# Patient Record
Sex: MALE | Race: WHITE | NOT HISPANIC OR LATINO | Employment: STUDENT | URBAN - METROPOLITAN AREA
[De-identification: names, ages, dates, MRNs, and addresses within clinical notes are randomized per-mention and may not be internally consistent; named-entity substitution may affect disease eponyms.]

---
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
## 2023-03-21 ENCOUNTER — TELEPHONE (OUTPATIENT)
Dept: INTERNAL MEDICINE | Facility: CLINIC | Age: 18
End: 2023-03-21
Payer: MEDICAID

## 2023-03-21 NOTE — TELEPHONE ENCOUNTER
----- Message from Con Marques MD sent at 3/20/2023  6:56 AM CDT -----  [facility] pt f/u appt.

## 2023-03-28 ENCOUNTER — PATIENT MESSAGE (OUTPATIENT)
Dept: SPINE | Facility: CLINIC | Age: 18
End: 2023-03-28
Payer: MEDICAID

## 2023-03-28 ENCOUNTER — TELEPHONE (OUTPATIENT)
Dept: NEUROSURGERY | Facility: CLINIC | Age: 18
End: 2023-03-28
Payer: MEDICAID

## 2023-03-28 NOTE — TELEPHONE ENCOUNTER
----- Message from Rozina Hernandez sent at 3/28/2023  2:26 PM CDT -----  Regarding: call back  Contact: Reina [phone]  Reina pt mom calling to make sure an get the status of chart  notes an anything else's that needs to be sent over before appt please call  to discuss  further

## 2023-03-28 NOTE — TELEPHONE ENCOUNTER
Informed patient that I haven't received any imaging as of yet and I will call once I receive it patient verbally understood.

## 2023-03-29 ENCOUNTER — PATIENT MESSAGE (OUTPATIENT)
Dept: NEUROSURGERY | Facility: CLINIC | Age: 18
End: 2023-03-29
Payer: MEDICAID

## 2023-03-30 ENCOUNTER — TELEPHONE (OUTPATIENT)
Dept: NEUROSURGERY | Facility: CLINIC | Age: 18
End: 2023-03-30
Payer: MEDICAID

## 2023-03-30 NOTE — TELEPHONE ENCOUNTER
----- Message from Daisy Copeland sent at 3/29/2023  5:56 PM CDT -----  Regarding: PT ADVICE  Contact: PT'S MOM  Pt's mom called regarding medical records sent by certified mail. Pt's mom states that confirmation receipt shows records were delivered on 3.27.23. Pt's mom would like a call back regarding an appt for her son and states that the May appt is too far out.    Please advise. Pt's mom can be reached at [phone].

## 2023-04-01 ENCOUNTER — PATIENT MESSAGE (OUTPATIENT)
Dept: NEUROSURGERY | Facility: CLINIC | Age: 18
End: 2023-04-01
Payer: MEDICAID

## 2023-04-03 ENCOUNTER — OFFICE VISIT (OUTPATIENT)
Dept: INTERNAL MEDICINE | Facility: CLINIC | Age: 18
End: 2023-04-03
Payer: MEDICAID

## 2023-04-03 ENCOUNTER — TELEPHONE (OUTPATIENT)
Dept: PAIN MEDICINE | Facility: CLINIC | Age: 18
End: 2023-04-03
Payer: MEDICAID

## 2023-04-03 VITALS
WEIGHT: 166 LBS | HEIGHT: 75 IN | TEMPERATURE: 98 F | SYSTOLIC BLOOD PRESSURE: 106 MMHG | BODY MASS INDEX: 20.64 KG/M2 | HEART RATE: 65 BPM | DIASTOLIC BLOOD PRESSURE: 78 MMHG

## 2023-04-03 DIAGNOSIS — Z28.9 DELAYED IMMUNIZATIONS: ICD-10-CM

## 2023-04-03 DIAGNOSIS — M54.2 NECK PAIN: Primary | ICD-10-CM

## 2023-04-03 DIAGNOSIS — M54.12 CERVICAL RADICULOPATHY: ICD-10-CM

## 2023-04-03 DIAGNOSIS — Z01.00 ROUTINE EYE EXAM: ICD-10-CM

## 2023-04-03 PROCEDURE — 1159F MED LIST DOCD IN RCRD: CPT | Mod: CPTII,,, | Performed by: FAMILY MEDICINE

## 2023-04-03 PROCEDURE — 3008F BODY MASS INDEX DOCD: CPT | Mod: CPTII,,, | Performed by: FAMILY MEDICINE

## 2023-04-03 PROCEDURE — 99214 OFFICE O/P EST MOD 30 MIN: CPT | Mod: S$PBB,,, | Performed by: FAMILY MEDICINE

## 2023-04-03 PROCEDURE — 3078F PR MOST RECENT DIASTOLIC BLOOD PRESSURE < 80 MM HG: ICD-10-PCS | Mod: CPTII,,, | Performed by: FAMILY MEDICINE

## 2023-04-03 PROCEDURE — 3074F PR MOST RECENT SYSTOLIC BLOOD PRESSURE < 130 MM HG: ICD-10-PCS | Mod: CPTII,,, | Performed by: FAMILY MEDICINE

## 2023-04-03 PROCEDURE — 99215 OFFICE O/P EST HI 40 MIN: CPT | Mod: PBBFAC,PO | Performed by: FAMILY MEDICINE

## 2023-04-03 PROCEDURE — 99999 PR PBB SHADOW E&M-EST. PATIENT-LVL V: CPT | Mod: PBBFAC,,, | Performed by: FAMILY MEDICINE

## 2023-04-03 PROCEDURE — 3008F PR BODY MASS INDEX (BMI) DOCUMENTED: ICD-10-PCS | Mod: CPTII,,, | Performed by: FAMILY MEDICINE

## 2023-04-03 PROCEDURE — 3074F SYST BP LT 130 MM HG: CPT | Mod: CPTII,,, | Performed by: FAMILY MEDICINE

## 2023-04-03 PROCEDURE — 3078F DIAST BP <80 MM HG: CPT | Mod: CPTII,,, | Performed by: FAMILY MEDICINE

## 2023-04-03 PROCEDURE — 96372 THER/PROPH/DIAG INJ SC/IM: CPT | Mod: PBBFAC,PO

## 2023-04-03 PROCEDURE — 99999 PR PBB SHADOW E&M-EST. PATIENT-LVL V: ICD-10-PCS | Mod: PBBFAC,,, | Performed by: FAMILY MEDICINE

## 2023-04-03 PROCEDURE — 1159F PR MEDICATION LIST DOCUMENTED IN MEDICAL RECORD: ICD-10-PCS | Mod: CPTII,,, | Performed by: FAMILY MEDICINE

## 2023-04-03 PROCEDURE — 99214 PR OFFICE/OUTPT VISIT, EST, LEVL IV, 30-39 MIN: ICD-10-PCS | Mod: S$PBB,,, | Performed by: FAMILY MEDICINE

## 2023-04-03 RX ORDER — CYCLOBENZAPRINE HCL 10 MG
10 TABLET ORAL NIGHTLY
Qty: 30 TABLET | Refills: 0 | Status: SHIPPED | OUTPATIENT
Start: 2023-04-03 | End: 2023-04-17

## 2023-04-03 RX ORDER — MELOXICAM 15 MG/1
15 TABLET ORAL DAILY
Qty: 30 TABLET | Refills: 0 | Status: SHIPPED | OUTPATIENT
Start: 2023-04-03 | End: 2023-11-02

## 2023-04-03 RX ORDER — KETOROLAC TROMETHAMINE 30 MG/ML
60 INJECTION, SOLUTION INTRAMUSCULAR; INTRAVENOUS
Status: COMPLETED | OUTPATIENT
Start: 2023-04-03 | End: 2023-04-03

## 2023-04-03 RX ADMIN — KETOROLAC TROMETHAMINE 60 MG: 60 INJECTION, SOLUTION INTRAMUSCULAR at 11:04

## 2023-04-03 NOTE — PROGRESS NOTES
Subjective:       Patient ID: Chaitanya Ace is a 18 y.o. male.    Chief Complaint: Neck Pain    Neck Pain   This is a chronic problem. The current episode started more than 1 year ago. The problem occurs constantly. The problem has been gradually worsening. Pertinent negatives include no chest pain.   Review of Systems   Respiratory:  Negative for shortness of breath.    Cardiovascular:  Negative for chest pain.   Gastrointestinal:  Negative for abdominal pain.   Musculoskeletal:  Positive for neck pain.     Objective:      Physical Exam  Vitals and nursing note reviewed.   Constitutional:       General: He is in acute distress.      Appearance: Normal appearance. He is well-developed. He is not diaphoretic.   HENT:      Head: Normocephalic and atraumatic.   Pulmonary:      Effort: Pulmonary effort is normal. No respiratory distress.      Breath sounds: Normal breath sounds. No wheezing.   Skin:     General: Skin is warm and dry.      Findings: No erythema or rash.   Neurological:      Mental Status: He is alert.       Assessment:       1. Neck pain    2. Delayed immunizations    3. Routine eye exam    4. Cervical radiculopathy        Plan:     Problem List Items Addressed This Visit          Neuro    Cervical radiculopathy    Relevant Orders    MRI Cervical Spine Without Contrast    Ambulatory referral/consult to Physical/Occupational Therapy    Ambulatory referral/consult to [facility] Spine Clinic       Ophtho    Routine eye exam    Relevant Orders    Ambulatory referral/consult to Optometry       ID    Delayed immunizations    Relevant Orders    HPV Vaccine (9-Valent) (3 Dose) (IM)    Tdap Vaccine       Orthopedic    Neck pain - Primary    Current Assessment & Plan     Toradol injection. Do not take aspirin or nsaids until 48 hrs after injection.             Relevant Medications    ketorolac injection 60 mg (Start on 4/3/2023 11:00 AM)    cyclobenzaprine (FLEXERIL) 10 MG tablet    Other Relevant Orders    MRI  Cervical Spine Without Contrast    Ambulatory referral/consult to Physical/Occupational Therapy    Ambulatory referral/consult to Back & Spine Clinic

## 2023-04-03 NOTE — TELEPHONE ENCOUNTER
Reached out to patient to schedule appointment . Inform patients that the departments access is limited at this time for any new Medicaid  Patients. Give pt the Centralized Scheduling number. Which is [phone] .. Pt did not answer . MARITZA.     Colin Otero  Medical Assistant

## 2023-04-04 ENCOUNTER — CLINICAL SUPPORT (OUTPATIENT)
Dept: REHABILITATION | Facility: HOSPITAL | Age: 18
End: 2023-04-04
Attending: FAMILY MEDICINE
Payer: MEDICAID

## 2023-04-04 DIAGNOSIS — M62.89 MUSCLE HYPERTONICITY: ICD-10-CM

## 2023-04-04 DIAGNOSIS — M54.12 CERVICAL RADICULOPATHY: ICD-10-CM

## 2023-04-04 DIAGNOSIS — M35.7 SHOULDER JOINT HYPERMOBILITY: ICD-10-CM

## 2023-04-04 DIAGNOSIS — M54.2 NECK PAIN: ICD-10-CM

## 2023-04-04 DIAGNOSIS — R29.898 UPPER EXTREMITY WEAKNESS: ICD-10-CM

## 2023-04-04 PROCEDURE — 97140 MANUAL THERAPY 1/> REGIONS: CPT | Mod: PN

## 2023-04-04 PROCEDURE — 97161 PT EVAL LOW COMPLEX 20 MIN: CPT | Mod: PN

## 2023-04-04 NOTE — PROGRESS NOTES
[facility]  Physical Therapy Initial Evaluation    Date: 4/4/2023   Name: Chaitanya Ace  Clinic Number: [id number]    Therapy Diagnosis:   Encounter Diagnoses   Name Primary?    Neck pain     Cervical radiculopathy     Muscle hypertonicity     Shoulder joint hypermobility     Upper extremity weakness      Physician: Con Marques MD    Physician Orders: PT Eval and Treat   Medical Diagnosis from Referral:    M54.2 (ICD-10-CM) - Neck pain M54.12 (ICD-10-CM) - Cervical radiculopathy   Evaluation Date: 4/4/2023  Authorization Period Expiration: 4/2/2024  Plan of Care Expiration: 4/4/2023  Visit # / Visits authorized: 1/ 1    PN DUE: 5/4/2023    Time In: 3:30 PM  Time Out: 4:15 PM  Total Appointment Time (timed & untimed codes): 45 minutes    Precautions: Standard    Subjective   Date of onset: Pt reports when he first began working out, 11 years old, and felt a pinch in bilateral upper traps. He states that since then he has had pain on and off again and has become more prevelent worse over the last couple of months.   Chronic     History of current condition - Chaitanya reports: bilateral neck and upper trap pain, left worse than right. He states that he is walking 40 minutes to an hour everyday followed with one exercise. He reports no past MVA. Increased pain with erect sitting, lifting objects out in front of him and sleeping. He has much difficulty falling asleep and staying asleep due to pain.      Medical History:   Past Medical History:   Diagnosis Date    ADHD     Tongue tie     Has had it cut     Surgical History:   Chaitanya Ace  has no past surgical history on file.    Medications:   Chaitanya has a current medication list which includes the following prescription(s): cyclobenzaprine and meloxicam.    Allergies:   Review of patient's allergies indicates:   Allergen Reactions    Sulfamethoxazole-trimethoprim Other (See Comments)      Imaging: MRI ordered    Prior Therapy: No  Social  "History:  lives with their family  Occupation: not currently working   Prior Level of Function: independent with functional mobility, work duties, recreational activities, and ADLs  Current Level of Function: unable to work due to pain, difficulty sleeping, increased pain with overhead activities and lifting    Pain:  Current 6/10, worst 10/10, best 3/10   Location:  bilateral upper trap pain (L>R) with radicular symptoms  Description: Burning; "shard of glass from head to upper trap that I can feel in my left sinus"  Aggravating Factors: erect sitting, overhead lifting  Easing Factors: hot shower, stretches    Patients goals: decreased pain, return to working out, return to prior level of function     Objective   Observation: pt pleasant and appropriate throughout evaluation; A&O x 3.   - Pt presents with nervous affect    Posture: In static sitting pt sits in hunched over position with severe rounded shoulders and hung head. Increased pain with erect upright posture.     Cervical Range of Motion: Pt has full range of motion with all planes of motion. Increased pain with all motions.  He exhibits hypermobility in all directions.      Shoulder Range of Motion:  Pt has full range of motion with all planes of motion. Increased pain with all motions.  He exhibits hypermobility in all directions.      Upper Extremity Strength  (R) UE  (L) UE    Shoulder flexion: 4+/5 Shoulder flexion: 4+/5   Shoulder Abduction: 4+/5 Shoulder abduction: 4+/5   Shoulder ER 4+/5 Shoulder ER 4+/5   Shoulder IR 4+/5 Shoulder IR 4+/5   Elbow flexion: 5/5 Elbow flexion: 5/5   Elbow extension: 4/5 Elbow extension: 4/5    5/5 : 5/5   Lower Trap 4+/5 Lower Trap 4+/5   Middle Trap 4+/5 Middle Trap 4+/5   Rhomboids 4+/5 Rhomboids 4+/5     Joint Mobility: Hypermobility in cervical spine and bilateral shoulders. No pain with PA's or transverse glides in cervical spine    Palpation: TTP over rigth cervical paraspinals, right upper trap and " under right shoulder blade. TTP over left cercial paraspinals, left upper trap, left levator scap, and left pectoralis       Sensation: Intact to light touch in bilateral upper extremities. pt reports occassional numbness in bilateral upper extremities.     Function: FOTO  WILL BE GIVEN AT NEXT SESSION     TREATMENT   Treatment Time In: 4:00 PM  Treatment Time Out: 4:15 PM  Total Treatment time (time-based codes) separate from Evaluation: 15 minutes    Chaitanya received therapeutic exercises to develop strength, endurance, ROM, flexibility, posture, and core stabilization for 5 minutes including:  (B) upper trap stretch   (B) levator scap stretch   Pectoralis stretch (corner/low)     Chaitanya received the following manual therapy techniques: Joint mobilizations, Manual traction, Myofacial release, Soft tissue Mobilization, and Friction Massage were applied to the: cervical neck for 10 minutes, including:  Cervical traction with suboccipital release  Palpation Assessment to determine the necessity for Functional Dry Needling of Left upper trapezius with electrical stimulation.   See EMR under MEDIA for written consent provided 4/4/2023.     Home Exercises and Patient Education Provided    Education provided:   - - PT POC  - HEP  - PT prognosis and diagnosis  - all questions and concerns answered   - Discussed risk and benefits of FDN. Educated to apply moist heat followed with HEP stretches if soreness persists. 4/4/2023     Written Home Exercises Provided: yes.  Exercises were reviewed and Chaitanya was able to demonstrate them prior to the end of the session.  Chaitanya demonstrated good  understanding of the education provided.     See EMR under Patient Instructions for exercises provided  4/4/2023 .    Assessment   Chaitanya is a 18 y.o. male referred to outpatient Physical Therapy with a medical diagnosis of M54.2 (ICD-10-CM) - Neck pain M54.12 (ICD-10-CM) - Cervical radiculopathy .     The patient presents with impairments which include  impairments list: strength and posture.  These impairments are limiting patient's ability to sit upright, lift objects, sleep at night.     Pt prognosis is Good due to personal factors and co-morbidities listed below. Pt will benefit from skilled outpatient Physical Therapy to address the deficits stated above and in the chart below, provide pt/family education, and to maximize pt's level of independence.     Patient prognosis is Good.   Patientt will benefit from skilled outpatient Physical Therapy to address the deficits stated above and in the chart below, provide patient /family education, and to maximize patientt's level of independence.     Plan of care discussed with patient: Yes  Patient's spiritual, cultural and educational needs considered and patient is agreeable to the plan of care and goals as stated below:     Anticipated Barriers for therapy: pain, posture    Medical Necessity is demonstrated by the following  History  Co-morbidities and personal factors that may impact the plan of care Co-morbidities:   See above    Personal Factors:   age     low   Examination  Body Structures and Functions, activity limitations and participation restrictions that may impact the plan of care Body Regions:   head  neck  upper extremities    Body Systems:    strength    Participation Restrictions:   Work  Recreational activities  Sleep  Lifting     Activity limitations:   Learning and applying knowledge  no deficits    General Tasks and Commands  no deficits    Communication  Nervous affect    Mobility  lifting and carrying objects  driving (bike, car, motorcycle)    Self care  no deficits    Domestic Life  doing house work (cleaning house, washing dishes, laundry)    Interactions/Relationships  no deficits    Life Areas  no deficits    Community and Social Life  no deficits         low   Clinical Presentation stable and uncomplicated low   Decision Making/ Complexity Score: low     Goals:  Short Term Goals: 5 weeks    Patient will demonstrate independence with HEP in order to progress toward functional independence  Pt will demonstrate improved pain by reports of less than or equal to 7/10 worst pain on the verbal rating scale in order to progress toward maximal functional ability and improve QOL.  pt able to sit in upright seated position with less than 5/10 pain for improved QOL  pt will be able to lift greater than or equal to 10 pounds form floor to waist with proper body mechanics for increased ease with lifting activities.     Long Term Goals: 10 weeks   Patient will demonstrate improved function as indicated by a functional limitation score of  (will input once provided ) % on the FOTO.  Pt will demonstrate improved pain by reports of less than or equal to 5/10 worst pain on the verbal rating scale in order to progress toward maximal functional ability and improve QOL.  Pt will be able to correct slouch posture independently in static sitting for improved QOL  Pt will improve bilateral upper extremity strength to 5/5/ with no pain for increased strength needed to perform lifting tasks  pt will exhibit no pain with cervical range of motion in all planes for improved QOL and posture  pt will exhibit no pain with bilateral shoulder range of motion in all planes for improved QOL and posture   Pt will be able to lift greater than or equal to 20 pounds from floor to waist for increased ease with lifting tasks   pt will report he has returned to the gym with less than 2/10 pain for return to prior level of function.     Plan   Plan of care Certification: 4/4/2023 to 7/4/2023.    Outpatient Physical Therapy 2 times weekly for 10 weeks to include the following interventions: Aquatic Therapy, Cervical/Lumbar Traction, Electrical Stimulation  , Gait Training, Manual Therapy, Moist Heat/ Ice, Neuromuscular Re-ed, Orthotic Management and Training, Patient Education, Self Care, Therapeutic Activities, Therapeutic Exercise, and  Ultrasound. And any other therapies and modalities as clinically indicated and appropriate, including but not limited to FDN and telehealth. Pt may be seen by PTA as a part of pt's care team.     Ailyn Castellano, PT, DPT  4/4/2023

## 2023-04-04 NOTE — PLAN OF CARE
[facility]  Physical Therapy Initial Evaluation    Date: 4/4/2023   Name: Chaitanya Ace  Clinic Number: [id number]    Therapy Diagnosis:   Encounter Diagnoses   Name Primary?    Neck pain     Cervical radiculopathy     Muscle hypertonicity     Shoulder joint hypermobility     Upper extremity weakness      Physician: Con Marques MD    Physician Orders: PT Eval and Treat   Medical Diagnosis from Referral:    M54.2 (ICD-10-CM) - Neck pain M54.12 (ICD-10-CM) - Cervical radiculopathy   Evaluation Date: 4/4/2023  Authorization Period Expiration: 4/2/2024  Plan of Care Expiration: 4/4/2023  Visit # / Visits authorized: 1/ 1    PN DUE: 5/4/2023    Time In: 3:30 PM  Time Out: 4:15 PM  Total Appointment Time (timed & untimed codes): 45 minutes    Precautions: Standard    Subjective   Date of onset: Pt reports when he first began working out, 11 years old, and felt a pinch in bilateral upper traps. He states that since then he has had pain on and off again and has become more prevelent worse over the last couple of months.   Chronic     History of current condition - Chaitanya reports: bilateral neck and upper trap pain, left worse than right. He states that he is walking 40 minutes to an hour everyday followed with one exercise. He reports no past MVA. Increased pain with erect sitting, lifting objects out in front of him and sleeping. He has much difficulty falling asleep and staying asleep due to pain.      Medical History:   Past Medical History:   Diagnosis Date    ADHD     Tongue tie     Has had it cut     Surgical History:   Chaitanya Ace  has no past surgical history on file.    Medications:   Chaitanya has a current medication list which includes the following prescription(s): cyclobenzaprine and meloxicam.    Allergies:   Review of patient's allergies indicates:   Allergen Reactions    Sulfamethoxazole-trimethoprim Other (See Comments)      Imaging: MRI ordered    Prior Therapy: No  Social  "History:  lives with their family  Occupation: not currently working   Prior Level of Function: independent with functional mobility, work duties, recreational activities, and ADLs  Current Level of Function: unable to work due to pain, difficulty sleeping, increased pain with overhead activities and lifting    Pain:  Current 6/10, worst 10/10, best 3/10   Location:  bilateral upper trap pain (L>R) with radicular symptoms  Description: Burning; "shard of glass from head to upper trap that I can feel in my left sinus"  Aggravating Factors: erect sitting, overhead lifting  Easing Factors: hot shower, stretches    Patients goals: decreased pain, return to working out, return to prior level of function     Objective   Observation: pt pleasant and appropriate throughout evaluation; A&O x 3.   - Pt presents with nervous affect    Posture: In static sitting pt sits in hunched over position with severe rounded shoulders and hung head. Increased pain with erect upright posture.     Cervical Range of Motion: Pt has full range of motion with all planes of motion. Increased pain with all motions.  He exhibits hypermobility in all directions.      Shoulder Range of Motion:  Pt has full range of motion with all planes of motion. Increased pain with all motions.  He exhibits hypermobility in all directions.      Upper Extremity Strength  (R) UE  (L) UE    Shoulder flexion: 4+/5 Shoulder flexion: 4+/5   Shoulder Abduction: 4+/5 Shoulder abduction: 4+/5   Shoulder ER 4+/5 Shoulder ER 4+/5   Shoulder IR 4+/5 Shoulder IR 4+/5   Elbow flexion: 5/5 Elbow flexion: 5/5   Elbow extension: 4/5 Elbow extension: 4/5    5/5 : 5/5   Lower Trap 4+/5 Lower Trap 4+/5   Middle Trap 4+/5 Middle Trap 4+/5   Rhomboids 4+/5 Rhomboids 4+/5     Joint Mobility: Hypermobility in cervical spine and bilateral shoulders. No pain with PA's or transverse glides in cervical spine    Palpation: TTP over rigth cervical paraspinals, right upper trap and " under right shoulder blade. TTP over left cercial paraspinals, left upper trap, left levator scap, and left pectoralis       Sensation: Intact to light touch in bilateral upper extremities. pt reports occassional numbness in bilateral upper extremities.     Function: FOTO  WILL BE GIVEN AT NEXT SESSION     TREATMENT   Treatment Time In: 4:00 PM  Treatment Time Out: 4:15 PM  Total Treatment time (time-based codes) separate from Evaluation: 15 minutes    Chaitanya received therapeutic exercises to develop strength, endurance, ROM, flexibility, posture, and core stabilization for 5 minutes including:  (B) upper trap stretch   (B) levator scap stretch   Pectoralis stretch (corner/low)     Chaitanya received the following manual therapy techniques: Joint mobilizations, Manual traction, Myofacial release, Soft tissue Mobilization, and Friction Massage were applied to the: cervical neck for 10 minutes, including:  Cervical traction with suboccipital release  Palpation Assessment to determine the necessity for Functional Dry Needling of Left upper trapezius with electrical stimulation.   See EMR under MEDIA for written consent provided 4/4/2023.     Home Exercises and Patient Education Provided    Education provided:   - - PT POC  - HEP  - PT prognosis and diagnosis  - all questions and concerns answered   - Discussed risk and benefits of FDN. Educated to apply moist heat followed with HEP stretches if soreness persists. 4/4/2023     Written Home Exercises Provided: yes.  Exercises were reviewed and Chaitanya was able to demonstrate them prior to the end of the session.  Chaitanya demonstrated good  understanding of the education provided.     See EMR under Patient Instructions for exercises provided 4/4/2023.    Assessment   Chaitanya is a 18 y.o. male referred to outpatient Physical Therapy with a medical diagnosis of M54.2 (ICD-10-CM) - Neck pain M54.12 (ICD-10-CM) - Cervical radiculopathy .     The patient presents with impairments which include  impairments list: strength and posture.  These impairments are limiting patient's ability to sit upright, lift objects, sleep at night.     Pt prognosis is Good due to personal factors and co-morbidities listed below. Pt will benefit from skilled outpatient Physical Therapy to address the deficits stated above and in the chart below, provide pt/family education, and to maximize pt's level of independence.     Patient prognosis is Good.   Patientt will benefit from skilled outpatient Physical Therapy to address the deficits stated above and in the chart below, provide patient /family education, and to maximize patientt's level of independence.     Plan of care discussed with patient: Yes  Patient's spiritual, cultural and educational needs considered and patient is agreeable to the plan of care and goals as stated below:     Anticipated Barriers for therapy: pain, posture    Medical Necessity is demonstrated by the following  History  Co-morbidities and personal factors that may impact the plan of care Co-morbidities:   See above    Personal Factors:   age     low   Examination  Body Structures and Functions, activity limitations and participation restrictions that may impact the plan of care Body Regions:   head  neck  upper extremities    Body Systems:    strength    Participation Restrictions:   Work  Recreational activities  Sleep  Lifting     Activity limitations:   Learning and applying knowledge  no deficits    General Tasks and Commands  no deficits    Communication  Nervous affect    Mobility  lifting and carrying objects  driving (bike, car, motorcycle)    Self care  no deficits    Domestic Life  doing house work (cleaning house, washing dishes, laundry)    Interactions/Relationships  no deficits    Life Areas  no deficits    Community and Social Life  no deficits         low   Clinical Presentation stable and uncomplicated low   Decision Making/ Complexity Score: low     Goals:  Short Term Goals: 5 weeks    Patient will demonstrate independence with HEP in order to progress toward functional independence  Pt will demonstrate improved pain by reports of less than or equal to 7/10 worst pain on the verbal rating scale in order to progress toward maximal functional ability and improve QOL.  pt able to sit in upright seated position with less than 5/10 pain for improved QOL  pt will be able to lift greater than or equal to 10 pounds form floor to waist with proper body mechanics for increased ease with lifting activities.     Long Term Goals: 10 weeks   Patient will demonstrate improved function as indicated by a functional limitation score of  (will input once provided ) % on the FOTO.  Pt will demonstrate improved pain by reports of less than or equal to 5/10 worst pain on the verbal rating scale in order to progress toward maximal functional ability and improve QOL.  Pt will be able to correct slouch posture independently in static sitting for improved QOL  Pt will improve bilateral upper extremity strength to 5/5/ with no pain for increased strength needed to perform lifting tasks  pt will exhibit no pain with cervical range of motion in all planes for improved QOL and posture  pt will exhibit no pain with bilateral shoulder range of motion in all planes for improved QOL and posture   Pt will be able to lift greater than or equal to 20 pounds from floor to waist for increased ease with lifting tasks   pt will report he has returned to the gym with less than 2/10 pain for return to prior level of function.     Plan   Plan of care Certification: 4/4/2023 to 7/4/2023.    Outpatient Physical Therapy 2 times weekly for 10 weeks to include the following interventions: Aquatic Therapy, Cervical/Lumbar Traction, Electrical Stimulation , Gait Training, Manual Therapy, Moist Heat/ Ice, Neuromuscular Re-ed, Orthotic Management and Training, Patient Education, Self Care, Therapeutic Activities, Therapeutic Exercise, and  Ultrasound. And any other therapies and modalities as clinically indicated and appropriate, including but not limited to FDN and telehealth. Pt may be seen by PTA as a part of pt's care team.     Ailyn Castellano, PT, DPT  4/4/2023

## 2023-04-05 ENCOUNTER — PATIENT MESSAGE (OUTPATIENT)
Dept: INTERNAL MEDICINE | Facility: CLINIC | Age: 18
End: 2023-04-05
Payer: MEDICAID

## 2023-04-10 ENCOUNTER — PATIENT MESSAGE (OUTPATIENT)
Dept: NEUROSURGERY | Facility: CLINIC | Age: 18
End: 2023-04-10
Payer: MEDICAID

## 2023-04-11 ENCOUNTER — CLINICAL SUPPORT (OUTPATIENT)
Dept: REHABILITATION | Facility: HOSPITAL | Age: 18
End: 2023-04-11
Payer: MEDICAID

## 2023-04-11 DIAGNOSIS — M35.7 SHOULDER JOINT HYPERMOBILITY: ICD-10-CM

## 2023-04-11 DIAGNOSIS — R29.898 UPPER EXTREMITY WEAKNESS: ICD-10-CM

## 2023-04-11 DIAGNOSIS — M62.89 MUSCLE HYPERTONICITY: Primary | ICD-10-CM

## 2023-04-11 PROCEDURE — 97112 NEUROMUSCULAR REEDUCATION: CPT | Mod: PN,CQ

## 2023-04-11 PROCEDURE — 97110 THERAPEUTIC EXERCISES: CPT | Mod: PN,CQ

## 2023-04-11 NOTE — PROGRESS NOTES
"  Physical Therapy Daily Treatment Note     Name: Chaitanya Ace  Clinic Number: [id number]    Therapy Diagnosis:   Encounter Diagnoses   Name Primary?    Muscle hypertonicity Yes    Shoulder joint hypermobility     Upper extremity weakness      Physician: Con Marques MD    Visit Date: 4/11/2023    Physician Orders: PT Eval and Treat   Medical Diagnosis from Referral:               M54.2 (ICD-10-CM) - Neck pain M54.12 (ICD-10-CM) - Cervical radiculopathy   Evaluation Date: 4/4/2023  Authorization Period Expiration: 4/2/2024  Plan of Care Expiration: 4/4/2023  Visit # / Visits authorized: 1/ 1     PN DUE: 5/4/2023    PTA Visit: 1/5    Time In: 12:30  Time Out: 1:15  Total Billable Time: 45 minutes    Precautions: Standard    Subjective     Pt reports: not much pain at the moment.  He was compliant with home exercise program.  Response to previous treatment: first full treatment after evaluation  Functional change:     Pain:  Current 6/10, worst 10/10, best 3/10   Location:  bilateral upper trap pain (L>R) with radicular symptoms  Description: Burning; "shard of glass from head to upper trap that I can feel in my left sinus"  Aggravating Factors: erect sitting, overhead lifting  Easing Factors: hot shower, stretches    Objective   Objective Measures updated at progress report unless specified.     Treatment     Chaitanya received therapeutic exercises to develop strength, endurance, ROM, flexibility, posture, and core stabilization for 25 minutes including:  UBE bike, 3/3'  Free motion rows 20#, 3x10  Free motion extensions, 20#, 3x10  Lat pulldowns 20#, 3x10  Standing shoulder ER, 3x10 ea  (B) upper trap stretch, 3x30"  (B) levator scap stretch, 3x30"  Pectoralis stretch (corner/low), 3x30"     Chaitanya received the following manual therapy techniques: Joint mobilizations, Manual traction, Myofacial release, Soft tissue Mobilization, and Friction Massage were applied to the: cervical neck for 0 minutes, " including:  Cervical traction with suboccipital release  Palpation Assessment to determine the necessity for Functional Dry Needling of Left upper trapezius with electrical stimulation.   See EMR under MEDIA for written consent provided 4/4/2023.     Chaitanya participated in neuromuscular re-education activities to improve: Balance, Coordination, Kinesthetic, Sense, Proprioception, and Posture for 20 minutes. The following activities were included:  (B) shoulder ER against GTB, 3x10  (B) shoulder W's against RTB, 3x10  Standing Shoulder I's, Y's, T's against GTB, [id number]        Chaitanya participated in dynamic functional therapeutic activities to improve functional performance for 0  minutes, including:        Home Exercises Provided and Patient Education Provided     Education provided:   - rationale for treatment    Written Home Exercises Provided: Patient instructed to cont prior HEP.  Exercises were reviewed and Chaitanya was able to demonstrate them prior to the end of the session.  Chaiatnya demonstrated good  understanding of the education provided.     See EMR under Patient Instructions for exercises provided prior visit.    Assessment     Patient returns to therapy for first full treatment after evaluation.  Postural muscles are very weak and need to be strengthened.  Scapular muscles also need to be strengthened.  Focused on these area this session.  He was able to perform all interventions without an increase in pain.  Education provided on delayed onset muscle soreness.  Will inquire response to treatment next session.  Chaitanya Is progressing well towards his goals.   Pt prognosis is Good.     Pt will continue to benefit from skilled outpatient physical therapy to address the deficits listed in the problem list box on initial evaluation, provide pt/family education and to maximize pt's level of independence in the home and community environment.     Pt's spiritual, cultural and educational needs considered and pt agreeable to  plan of care and goals.     Anticipated barriers to physical therapy:     Goals:   Short Term Goals: 5 weeks   Patient will demonstrate independence with HEP in order to progress toward functional independence  Pt will demonstrate improved pain by reports of less than or equal to 7/10 worst pain on the verbal rating scale in order to progress toward maximal functional ability and improve QOL.  pt able to sit in upright seated position with less than 5/10 pain for improved QOL  pt will be able to lift greater than or equal to 10 pounds form floor to waist with proper body mechanics for increased ease with lifting activities.      Long Term Goals: 10 weeks   Patient will demonstrate improved function as indicated by a functional limitation score of  (will input once provided ) % on the FOTO.  Pt will demonstrate improved pain by reports of less than or equal to 5/10 worst pain on the verbal rating scale in order to progress toward maximal functional ability and improve QOL.  Pt will be able to correct slouch posture independently in static sitting for improved QOL  Pt will improve bilateral upper extremity strength to 5/5/ with no pain for increased strength needed to perform lifting tasks  pt will exhibit no pain with cervical range of motion in all planes for improved QOL and posture  pt will exhibit no pain with bilateral shoulder range of motion in all planes for improved QOL and posture   Pt will be able to lift greater than or equal to 20 pounds from floor to waist for increased ease with lifting tasks   pt will report he has returned to the gym with less than 2/10 pain for return to prior level of function.        Plan     Plan of care Certification: 4/4/2023 to 7/4/2023.     Outpatient Physical Therapy 2 times weekly for 10 weeks to include the following interventions: Aquatic Therapy, Cervical/Lumbar Traction, Electrical Stimulation , Gait Training, Manual Therapy, Moist Heat/ Ice, Neuromuscular Re-ed,  Orthotic Management and Training, Patient Education, Self Care, Therapeutic Activities, Therapeutic Exercise, and Ultrasound. And any other therapies and modalities as clinically indicated and appropriate, including but not limited to FDN and telehealth. Pt may be seen by PTA as a part of pt's care team.     Jay Jay Barnes, PTA  4/11/2023

## 2023-04-17 ENCOUNTER — LAB VISIT (OUTPATIENT)
Dept: LAB | Facility: HOSPITAL | Age: 18
End: 2023-04-17
Attending: FAMILY MEDICINE
Payer: MEDICAID

## 2023-04-17 ENCOUNTER — OFFICE VISIT (OUTPATIENT)
Dept: INTERNAL MEDICINE | Facility: CLINIC | Age: 18
End: 2023-04-17
Payer: MEDICAID

## 2023-04-17 ENCOUNTER — PATIENT MESSAGE (OUTPATIENT)
Dept: INTERNAL MEDICINE | Facility: CLINIC | Age: 18
End: 2023-04-17

## 2023-04-17 ENCOUNTER — TELEPHONE (OUTPATIENT)
Dept: INTERNAL MEDICINE | Facility: CLINIC | Age: 18
End: 2023-04-17

## 2023-04-17 ENCOUNTER — CLINICAL SUPPORT (OUTPATIENT)
Dept: REHABILITATION | Facility: HOSPITAL | Age: 18
End: 2023-04-17
Payer: MEDICAID

## 2023-04-17 VITALS
SYSTOLIC BLOOD PRESSURE: 116 MMHG | OXYGEN SATURATION: 100 % | TEMPERATURE: 98 F | BODY MASS INDEX: 20.86 KG/M2 | WEIGHT: 166.88 LBS | HEART RATE: 85 BPM | DIASTOLIC BLOOD PRESSURE: 68 MMHG

## 2023-04-17 DIAGNOSIS — M35.7 SHOULDER JOINT HYPERMOBILITY: ICD-10-CM

## 2023-04-17 DIAGNOSIS — R29.898 UPPER EXTREMITY WEAKNESS: ICD-10-CM

## 2023-04-17 DIAGNOSIS — M62.89 MUSCLE HYPERTONICITY: Primary | ICD-10-CM

## 2023-04-17 DIAGNOSIS — Z91.09 ENVIRONMENTAL ALLERGIES: ICD-10-CM

## 2023-04-17 DIAGNOSIS — Z00.00 WELLNESS EXAMINATION: Primary | ICD-10-CM

## 2023-04-17 DIAGNOSIS — Z00.00 WELLNESS EXAMINATION: ICD-10-CM

## 2023-04-17 LAB
ALBUMIN SERPL BCP-MCNC: 4.9 G/DL (ref 3.5–5.2)
ALP SERPL-CCNC: 108 U/L (ref 55–135)
ALT SERPL W/O P-5'-P-CCNC: 33 U/L (ref 10–44)
ANION GAP SERPL CALC-SCNC: 11 MMOL/L (ref 8–16)
AST SERPL-CCNC: 23 U/L (ref 10–40)
BASOPHILS # BLD AUTO: 0.03 K/UL (ref 0–0.2)
BASOPHILS NFR BLD: 0.6 % (ref 0–1.9)
BILIRUB SERPL-MCNC: 0.6 MG/DL (ref 0.1–1)
BUN SERPL-MCNC: 16 MG/DL (ref 6–20)
CALCIUM SERPL-MCNC: 10.6 MG/DL (ref 8.7–10.5)
CHLORIDE SERPL-SCNC: 104 MMOL/L (ref 95–110)
CHOLEST SERPL-MCNC: 240 MG/DL (ref 120–199)
CHOLEST/HDLC SERPL: 6.2 {RATIO} (ref 2–5)
CO2 SERPL-SCNC: 29 MMOL/L (ref 23–29)
CREAT SERPL-MCNC: 0.9 MG/DL (ref 0.5–1.4)
DIFFERENTIAL METHOD: NORMAL
EOSINOPHIL # BLD AUTO: 0.1 K/UL (ref 0–0.5)
EOSINOPHIL NFR BLD: 1.8 % (ref 0–8)
ERYTHROCYTE [DISTWIDTH] IN BLOOD BY AUTOMATED COUNT: 12.6 % (ref 11.5–14.5)
EST. GFR  (NO RACE VARIABLE): >60 ML/MIN/1.73 M^2
ESTIMATED AVG GLUCOSE: 94 MG/DL (ref 68–131)
FERRITIN SERPL-MCNC: 162 NG/ML (ref 20–300)
GLUCOSE SERPL-MCNC: 49 MG/DL (ref 70–110)
HBA1C MFR BLD: 4.9 % (ref 4–5.6)
HCT VFR BLD AUTO: 47.8 % (ref 40–54)
HCV AB SERPL QL IA: NORMAL
HDLC SERPL-MCNC: 39 MG/DL (ref 40–75)
HDLC SERPL: 16.3 % (ref 20–50)
HGB BLD-MCNC: 15.5 G/DL (ref 14–18)
HIV 1+2 AB+HIV1 P24 AG SERPL QL IA: NORMAL
IMM GRANULOCYTES # BLD AUTO: 0.01 K/UL (ref 0–0.04)
IMM GRANULOCYTES NFR BLD AUTO: 0.2 % (ref 0–0.5)
IRON SERPL-MCNC: 175 UG/DL (ref 45–160)
LDLC SERPL CALC-MCNC: 172.6 MG/DL (ref 63–159)
LYMPHOCYTES # BLD AUTO: 1.9 K/UL (ref 1–4.8)
LYMPHOCYTES NFR BLD: 37.5 % (ref 18–48)
MCH RBC QN AUTO: 28.7 PG (ref 27–31)
MCHC RBC AUTO-ENTMCNC: 32.4 G/DL (ref 32–36)
MCV RBC AUTO: 89 FL (ref 82–98)
MONOCYTES # BLD AUTO: 0.4 K/UL (ref 0.3–1)
MONOCYTES NFR BLD: 8 % (ref 4–15)
NEUTROPHILS # BLD AUTO: 2.7 K/UL (ref 1.8–7.7)
NEUTROPHILS NFR BLD: 51.9 % (ref 38–73)
NONHDLC SERPL-MCNC: 201 MG/DL
NRBC BLD-RTO: 0 /100 WBC
PLATELET # BLD AUTO: 210 K/UL (ref 150–450)
PMV BLD AUTO: 12.4 FL (ref 9.2–12.9)
POTASSIUM SERPL-SCNC: 4.1 MMOL/L (ref 3.5–5.1)
PROT SERPL-MCNC: 7.4 G/DL (ref 6–8.4)
RBC # BLD AUTO: 5.4 M/UL (ref 4.6–6.2)
SATURATED IRON: 55 % (ref 20–50)
SODIUM SERPL-SCNC: 144 MMOL/L (ref 136–145)
TOTAL IRON BINDING CAPACITY: 318 UG/DL (ref 250–450)
TRANSFERRIN SERPL-MCNC: 215 MG/DL (ref 200–375)
TRIGL SERPL-MCNC: 142 MG/DL (ref 30–150)
TSH SERPL DL<=0.005 MIU/L-ACNC: 1.8 UIU/ML (ref 0.4–4)
WBC # BLD AUTO: 5.1 K/UL (ref 3.9–12.7)

## 2023-04-17 PROCEDURE — 36415 COLL VENOUS BLD VENIPUNCTURE: CPT | Mod: PO | Performed by: FAMILY MEDICINE

## 2023-04-17 PROCEDURE — 83036 HEMOGLOBIN GLYCOSYLATED A1C: CPT | Performed by: FAMILY MEDICINE

## 2023-04-17 PROCEDURE — 82728 ASSAY OF FERRITIN: CPT | Performed by: FAMILY MEDICINE

## 2023-04-17 PROCEDURE — 1159F MED LIST DOCD IN RCRD: CPT | Mod: CPTII,,, | Performed by: FAMILY MEDICINE

## 2023-04-17 PROCEDURE — 90715 TDAP VACCINE 7 YRS/> IM: CPT | Mod: PBBFAC,PO

## 2023-04-17 PROCEDURE — 3078F DIAST BP <80 MM HG: CPT | Mod: CPTII,,, | Performed by: FAMILY MEDICINE

## 2023-04-17 PROCEDURE — 84443 ASSAY THYROID STIM HORMONE: CPT | Performed by: FAMILY MEDICINE

## 2023-04-17 PROCEDURE — 99395 PREV VISIT EST AGE 18-39: CPT | Mod: S$PBB,,, | Performed by: FAMILY MEDICINE

## 2023-04-17 PROCEDURE — 80053 COMPREHEN METABOLIC PANEL: CPT | Performed by: FAMILY MEDICINE

## 2023-04-17 PROCEDURE — 87389 HIV-1 AG W/HIV-1&-2 AB AG IA: CPT | Performed by: FAMILY MEDICINE

## 2023-04-17 PROCEDURE — 99999 PR PBB SHADOW E&M-EST. PATIENT-LVL IV: CPT | Mod: PBBFAC,,, | Performed by: FAMILY MEDICINE

## 2023-04-17 PROCEDURE — 80061 LIPID PANEL: CPT | Performed by: FAMILY MEDICINE

## 2023-04-17 PROCEDURE — 85025 COMPLETE CBC W/AUTO DIFF WBC: CPT | Performed by: FAMILY MEDICINE

## 2023-04-17 PROCEDURE — 99999 PR PBB SHADOW E&M-EST. PATIENT-LVL IV: ICD-10-PCS | Mod: PBBFAC,,, | Performed by: FAMILY MEDICINE

## 2023-04-17 PROCEDURE — 84466 ASSAY OF TRANSFERRIN: CPT | Performed by: FAMILY MEDICINE

## 2023-04-17 PROCEDURE — 97110 THERAPEUTIC EXERCISES: CPT | Mod: PN

## 2023-04-17 PROCEDURE — 3008F BODY MASS INDEX DOCD: CPT | Mod: CPTII,,, | Performed by: FAMILY MEDICINE

## 2023-04-17 PROCEDURE — 3074F PR MOST RECENT SYSTOLIC BLOOD PRESSURE < 130 MM HG: ICD-10-PCS | Mod: CPTII,,, | Performed by: FAMILY MEDICINE

## 2023-04-17 PROCEDURE — 3074F SYST BP LT 130 MM HG: CPT | Mod: CPTII,,, | Performed by: FAMILY MEDICINE

## 2023-04-17 PROCEDURE — 99214 OFFICE O/P EST MOD 30 MIN: CPT | Mod: PBBFAC,PO | Performed by: FAMILY MEDICINE

## 2023-04-17 PROCEDURE — 86803 HEPATITIS C AB TEST: CPT | Performed by: FAMILY MEDICINE

## 2023-04-17 PROCEDURE — 1159F PR MEDICATION LIST DOCUMENTED IN MEDICAL RECORD: ICD-10-PCS | Mod: CPTII,,, | Performed by: FAMILY MEDICINE

## 2023-04-17 PROCEDURE — 3008F PR BODY MASS INDEX (BMI) DOCUMENTED: ICD-10-PCS | Mod: CPTII,,, | Performed by: FAMILY MEDICINE

## 2023-04-17 PROCEDURE — 99395 PR PREVENTIVE VISIT,EST,18-39: ICD-10-PCS | Mod: S$PBB,,, | Performed by: FAMILY MEDICINE

## 2023-04-17 PROCEDURE — 90471 IMMUNIZATION ADMIN: CPT | Mod: PBBFAC,PO

## 2023-04-17 PROCEDURE — 3078F PR MOST RECENT DIASTOLIC BLOOD PRESSURE < 80 MM HG: ICD-10-PCS | Mod: CPTII,,, | Performed by: FAMILY MEDICINE

## 2023-04-17 NOTE — PROGRESS NOTES
"  Physical Therapy Daily Treatment Note     Name: Chaitanya Ace  Clinic Number: [id number]    Therapy Diagnosis:   Encounter Diagnoses   Name Primary?    Muscle hypertonicity Yes    Shoulder joint hypermobility     Upper extremity weakness      Physician: Con Marques MD    Visit Date: 4/17/2023    Physician Orders: PT Eval and Treat   Medical Diagnosis from Referral:               M54.2 (ICD-10-CM) - Neck pain M54.12 (ICD-10-CM) - Cervical radiculopathy   Evaluation Date: 4/4/2023  Authorization Period Expiration: 4/2/2024  Plan of Care Expiration: 4/4/2023  Visit # / Visits authorized: 2/16     PN DUE: 5/4/2023  PTA Visit: 0/5    Time In: 2:15 PM  Time Out: 3:00 PM  Total Billable Time: 45 minutes    Precautions: Standard    Subjective   Pt reports: not much pain at the moment. He states decreased severity since initial evaluation and only some soreness following last therapy session. He reports 2/10 pain today as compared to his normal 6/10 pain.     He was compliant with home exercise program.  Response to previous treatment: some soreness  Functional change: decreased pain     Pain:  Current 6/10, worst 10/10, best 3/10   Location:  bilateral upper trap pain (L>R) with radicular symptoms  Description: Burning; "shard of glass from head to upper trap that I can feel in my left sinus"  Aggravating Factors: erect sitting, overhead lifting  Easing Factors: hot shower, stretches    Objective   Objective Measures updated at progress report unless specified.     Treatment     Chaitanya received therapeutic exercises to develop strength, endurance, ROM, flexibility, posture, and core stabilization for 25 minutes including:  UBE bike, 3/3'  Free motion rows 20#, 3x10  Free motion extensions, 20#, 3x10  Lat pulldowns 20#, 3x10  Free motion triceps extension 17# 3 x 10  (B) upper trap stretch, 3x30"  (B) levator scap stretch, 3x30"  Pectoralis stretch (corner/low), 3x30"     Chaitanya received the following manual therapy " techniques: Joint mobilizations, Manual traction, Myofacial release, Soft tissue Mobilization, and Friction Massage were applied to the: cervical neck for 0 minutes, including:  Cervical traction with suboccipital release  Palpation Assessment to determine the necessity for Functional Dry Needling of Left upper trapezius with electrical stimulation.   See EMR under MEDIA for written consent provided 4/4/2023.     Chaitanya participated in neuromuscular re-education activities to improve: Balance, Coordination, Kinesthetic, Sense, Proprioception, and Posture for 20 minutes. The following activities were included:  (B) shoulder ER against GTB, 3x10  (B) shoulder W's against RTB, 3x10  Standing Shoulder I's, Y's, T's against GTB, [id number]  Scapular wall push-ups 3 x 10   Wall clock RTB 2 x 5     Chaitanya participated in dynamic functional therapeutic activities to improve functional performance for 0  minutes, including:    Home Exercises Provided and Patient Education Provided     Education provided:   - rationale for treatment    Written Home Exercises Provided: Patient instructed to cont prior HEP.  Exercises were reviewed and Chaitanya was able to demonstrate them prior to the end of the session.  Chaitanya demonstrated good  understanding of the education provided.     See EMR under Patient Instructions for exercises provided prior visit.    Assessment   Pt tolerated therapy session well today with no adverse effects. Focused session on postural stabilization and scapular strengthening due to increased weakness. Continues to show increased fatigue and increased exertion to perform all exercises, no provocation of pain. Verbal cueing on control of motion provided throughout session. Scapular pushups performed today for increased scapular control. Continue to progress as tolerated. Will inquire response to treatment next session.    Chaitanya Is progressing well towards his goals.   Pt prognosis is Good.     Pt will continue to benefit from  skilled outpatient physical therapy to address the deficits listed in the problem list box on initial evaluation, provide pt/family education and to maximize pt's level of independence in the home and community environment.     Pt's spiritual, cultural and educational needs considered and pt agreeable to plan of care and goals.     Anticipated barriers to physical therapy:     Goals:   Short Term Goals: 5 weeks   Patient will demonstrate independence with HEP in order to progress toward functional independence  Pt will demonstrate improved pain by reports of less than or equal to 7/10 worst pain on the verbal rating scale in order to progress toward maximal functional ability and improve QOL.  pt able to sit in upright seated position with less than 5/10 pain for improved QOL  pt will be able to lift greater than or equal to 10 pounds form floor to waist with proper body mechanics for increased ease with lifting activities.      Long Term Goals: 10 weeks   Patient will demonstrate improved function as indicated by a functional limitation score of  (will input once provided ) % on the FOTO.  Pt will demonstrate improved pain by reports of less than or equal to 5/10 worst pain on the verbal rating scale in order to progress toward maximal functional ability and improve QOL.  Pt will be able to correct slouch posture independently in static sitting for improved QOL  Pt will improve bilateral upper extremity strength to 5/5/ with no pain for increased strength needed to perform lifting tasks  pt will exhibit no pain with cervical range of motion in all planes for improved QOL and posture  pt will exhibit no pain with bilateral shoulder range of motion in all planes for improved QOL and posture   Pt will be able to lift greater than or equal to 20 pounds from floor to waist for increased ease with lifting tasks   pt will report he has returned to the gym with less than 2/10 pain for return to prior level of function.         Plan     Plan of care Certification: 4/4/2023 to 7/4/2023.     Outpatient Physical Therapy 2 times weekly for 10 weeks to include the following interventions: Aquatic Therapy, Cervical/Lumbar Traction, Electrical Stimulation , Gait Training, Manual Therapy, Moist Heat/ Ice, Neuromuscular Re-ed, Orthotic Management and Training, Patient Education, Self Care, Therapeutic Activities, Therapeutic Exercise, and Ultrasound. And any other therapies and modalities as clinically indicated and appropriate, including but not limited to FDN and telehealth. Pt may be seen by PTA as a part of pt's care team.     Ailyn Castellano, PT, DPT  4/17/2023

## 2023-04-17 NOTE — TELEPHONE ENCOUNTER
CRITICAL LAB  Deborah from Chemistry lab at the [facility] in [facility] pt has a critical glucose lab of 49

## 2023-04-17 NOTE — PROGRESS NOTES
Subjective:       Patient ID: Chaitanya Ace is a 18 y.o. male.    Chief Complaint: No chief complaint on file.    Chaitanya Ace is a 18 y.o. male and is here for a comprehensive physical exam.    Do you take any herbs or supplements that were not prescribed by a doctor? no  Are you taking calcium supplements? no  Are you taking aspirin daily? no     History:  Any STD's in the past? none    The following portions of the patient's history were reviewed and updated as appropriate: allergies, current medications, past family history, past medical history, past social history, past surgical history and problem list.    Review of Systems  Do you have pain that bothers you in your daily life? no  Pertinent items are noted in HPI.      2. Patient Counseling:  --Nutrition: Stressed importance of moderation in sodium/caffeine intake, saturated fat and cholesterol, caloric balance.  --Exercise: Stressed the importance of regular exercise.   --Substance Abuse: Discussed cessation/primary prevention of tobacco, alcohol - nonuser   --Sexuality: Discussed sexually transmitted disease.  --Injury prevention: Discussed safety belts, smoke detector.   --Dental health: Discussed dental health.  --Immunizations reviewed.    3. Discussed the patient's BMI.  4. Follow up as needed for acute illness        Review of Systems   Constitutional:  Negative for activity change.   HENT:  Negative for ear pain.    Eyes:  Negative for pain.   Respiratory:  Negative for shortness of breath.    Cardiovascular:  Negative for chest pain.   Gastrointestinal:  Negative for abdominal pain.   Genitourinary:  Negative for dysuria.   Musculoskeletal:  Positive for neck pain.   Skin:  Negative for rash.   Neurological:  Negative for headaches.     Objective:      Physical Exam  Vitals and nursing note reviewed.   Constitutional:       General: He is not in acute distress.     Appearance: He is well-developed. He is not diaphoretic.   HENT:       Head: Normocephalic and atraumatic.   Cardiovascular:      Rate and Rhythm: Normal rate and regular rhythm.   Pulmonary:      Effort: Pulmonary effort is normal. No respiratory distress.      Breath sounds: Normal breath sounds. No wheezing.   Abdominal:      General: There is no distension.      Palpations: Abdomen is soft.      Tenderness: There is no abdominal tenderness. There is no guarding.   Musculoskeletal:      Right lower leg: No edema.      Left lower leg: No edema.   Skin:     General: Skin is warm and dry.      Findings: No erythema or rash.   Neurological:      Mental Status: He is alert. Mental status is at baseline.   Psychiatric:         Mood and Affect: Mood normal.         Thought Content: Thought content normal.       Assessment:       1. Wellness examination    2. Environmental allergies        Plan:     Problem List Items Addressed This Visit          Other    Wellness examination - Primary    Relevant Orders    CBC Auto Differential    Comprehensive Metabolic Panel    Hemoglobin A1C    Ferritin    Iron and TIBC    Hepatitis C Antibody    HIV 1/2 Ag/Ab (4th Gen)    Lipid Panel    TSH    Tdap Vaccine    HPV Vaccine (9-Valent) (3 Dose) (IM)    Environmental allergies    Relevant Orders    Ambulatory referral/consult to Allergy

## 2023-04-18 ENCOUNTER — HOSPITAL ENCOUNTER (OUTPATIENT)
Dept: RADIOLOGY | Facility: HOSPITAL | Age: 18
Discharge: HOME | End: 2023-04-18
Attending: FAMILY MEDICINE
Payer: MEDICAID

## 2023-04-18 DIAGNOSIS — M54.12 CERVICAL RADICULOPATHY: ICD-10-CM

## 2023-04-18 DIAGNOSIS — M54.2 NECK PAIN: ICD-10-CM

## 2023-04-18 PROCEDURE — 72141 MRI NECK SPINE W/O DYE: CPT | Mod: TC,PN

## 2023-04-18 PROCEDURE — 72141 MRI NECK SPINE W/O DYE: CPT | Mod: 26,,, | Performed by: STUDENT IN AN ORGANIZED HEALTH CARE EDUCATION/TRAINING PROGRAM

## 2023-04-18 PROCEDURE — 72141 MRI CERVICAL SPINE WITHOUT CONTRAST: ICD-10-PCS | Mod: 26,,, | Performed by: STUDENT IN AN ORGANIZED HEALTH CARE EDUCATION/TRAINING PROGRAM

## 2023-04-18 NOTE — PROGRESS NOTES
Trace disc osteophyte complex at C3-C4 and C6-C7.  Otherwise, unremarkable MR   Recommend Pt or pain management.

## 2023-04-20 ENCOUNTER — PATIENT MESSAGE (OUTPATIENT)
Dept: NEUROSURGERY | Facility: CLINIC | Age: 18
End: 2023-04-20
Payer: MEDICAID

## 2023-04-24 ENCOUNTER — CLINICAL SUPPORT (OUTPATIENT)
Dept: REHABILITATION | Facility: HOSPITAL | Age: 18
End: 2023-04-24
Payer: MEDICAID

## 2023-04-24 DIAGNOSIS — M35.7 SHOULDER JOINT HYPERMOBILITY: ICD-10-CM

## 2023-04-24 DIAGNOSIS — M62.89 MUSCLE HYPERTONICITY: Primary | ICD-10-CM

## 2023-04-24 DIAGNOSIS — R29.898 UPPER EXTREMITY WEAKNESS: ICD-10-CM

## 2023-04-24 PROCEDURE — 97110 THERAPEUTIC EXERCISES: CPT | Mod: PN

## 2023-04-24 NOTE — PROGRESS NOTES
"Physical Therapy Daily Treatment Note     Name: Chaitanya Ace  Clinic Number: [id number]    Therapy Diagnosis:   Encounter Diagnoses   Name Primary?    Muscle hypertonicity Yes    Shoulder joint hypermobility     Upper extremity weakness      Physician: Con Marques MD    Visit Date: 4/24/2023    Physician Orders: PT Eval and Treat   Medical Diagnosis from Referral:               M54.2 (ICD-10-CM) - Neck pain M54.12 (ICD-10-CM) - Cervical radiculopathy   Evaluation Date: 4/4/2023  Authorization Period Expiration: 4/2/2024  Plan of Care Expiration: 4/4/2023  Visit # / Visits authorized: 4/16     PN DUE: 5/4/2023  PTA Visit: 0/5    Time In: 1:30 PM  Time Out: 2:15 PM  Total Billable Time: 45 minutes    Precautions: Standard    Subjective   Pt reports: minimal to no neck pain and reports pain is mostly between shoulder blades.     He was compliant with home exercise program.  Response to previous treatment: some soreness  Functional change: decreased pain     Pain:  Current 6/10, worst 10/10, best 3/10   Location:  bilateral upper trap pain (L>R) with radicular symptoms  Description: Burning; "shard of glass from head to upper trap that I can feel in my left sinus"  Aggravating Factors: erect sitting, overhead lifting  Easing Factors: hot shower, stretches    Objective     Objective Measures updated at progress report unless specified.     Treatment     Billing all therapy as therapeutic exercise per [place] Medicaid mary Tavares received therapeutic exercises to develop strength, endurance, ROM, flexibility, posture, and core stabilization for 25 minutes including:  UBE bike, 3/3'  Plinth push-ups 3 x 10  Free motion rows 30#, 3x10 (focus on eccentric control)   Free motion extensions, 20#, 3x10  Free motion bicep curls 27# 3 x 10  Free motion SA Lat pulldowns 20#, 3x10  Free motion triceps extension 20# 3 x 10  (B) upper trap stretch, 3x30"  (B) levator scap stretch, 3x30"  Pectoralis stretch (corner/low), " "3x30"     Chaitanya received the following manual therapy techniques: Joint mobilizations, Manual traction, Myofacial release, Soft tissue Mobilization, and Friction Massage were applied to the: cervical neck for 0 minutes, including:  Cervical traction with suboccipital release  Palpation Assessment to determine the necessity for Functional Dry Needling of Left upper trapezius with electrical stimulation.   See EMR under MEDIA for written consent provided 4/4/2023.     Chaitanya participated in neuromuscular re-education activities to improve: Balance, Coordination, Kinesthetic, Sense, Proprioception, and Posture for 20 minutes. The following activities were included:  (B) shoulder ER against GTB, 3x10  (B) shoulder W's against RTB, 3x10  Standing Shoulder I's, Y's, T's against RTB, [id number]  Scapular wall push-ups 3 x 10   Wall clock RTB 2 x 5     Chaitanya participated in dynamic functional therapeutic activities to improve functional performance for 0  minutes, including:    Home Exercises Provided and Patient Education Provided     Education provided:   - rationale for treatment    Written Home Exercises Provided: Patient instructed to cont prior HEP.  Exercises were reviewed and Chaitanya was able to demonstrate them prior to the end of the session.  Chaitanya demonstrated good  understanding of the education provided.     See EMR under Patient Instructions for exercises provided prior visit.    Assessment   Pt tolerated therapy session well today with no adverse effects. Focused session on postural stabilization and scapular strengthening due to increased weakness. Requires cueing on control of motion throughout therapy session. Continues with increased difficulty with scapular retraction and scapular control. Added plinth push-ups with increased fatigue noted following each set. Continue to progress as tolerated. Will inquire response to treatment next session.    Chaitanya Is progressing well towards his goals.   Pt prognosis is Good.     Pt " will continue to benefit from skilled outpatient physical therapy to address the deficits listed in the problem list box on initial evaluation, provide pt/family education and to maximize pt's level of independence in the home and community environment.     Pt's spiritual, cultural and educational needs considered and pt agreeable to plan of care and goals.     Anticipated barriers to physical therapy:     Goals:   Short Term Goals: 5 weeks   Patient will demonstrate independence with HEP in order to progress toward functional independence  Pt will demonstrate improved pain by reports of less than or equal to 7/10 worst pain on the verbal rating scale in order to progress toward maximal functional ability and improve QOL.  pt able to sit in upright seated position with less than 5/10 pain for improved QOL  pt will be able to lift greater than or equal to 10 pounds form floor to waist with proper body mechanics for increased ease with lifting activities.      Long Term Goals: 10 weeks   Patient will demonstrate improved function as indicated by a functional limitation score of  (will input once provided ) % on the FOTO.  Pt will demonstrate improved pain by reports of less than or equal to 5/10 worst pain on the verbal rating scale in order to progress toward maximal functional ability and improve QOL.  Pt will be able to correct slouch posture independently in static sitting for improved QOL  Pt will improve bilateral upper extremity strength to 5/5/ with no pain for increased strength needed to perform lifting tasks  pt will exhibit no pain with cervical range of motion in all planes for improved QOL and posture  pt will exhibit no pain with bilateral shoulder range of motion in all planes for improved QOL and posture   Pt will be able to lift greater than or equal to 20 pounds from floor to waist for increased ease with lifting tasks   pt will report he has returned to the gym with less than 2/10 pain for return  to prior level of function.        Plan     Plan of care Certification: 4/4/2023 to 7/4/2023.     Outpatient Physical Therapy 2 times weekly for 10 weeks to include the following interventions: Aquatic Therapy, Cervical/Lumbar Traction, Electrical Stimulation , Gait Training, Manual Therapy, Moist Heat/ Ice, Neuromuscular Re-ed, Orthotic Management and Training, Patient Education, Self Care, Therapeutic Activities, Therapeutic Exercise, and Ultrasound. And any other therapies and modalities as clinically indicated and appropriate, including but not limited to FDN and telehealth. Pt may be seen by PTA as a part of pt's care team.     Ailyn Castellano, PT, DPT  4/24/2023

## 2023-04-26 ENCOUNTER — CLINICAL SUPPORT (OUTPATIENT)
Dept: REHABILITATION | Facility: HOSPITAL | Age: 18
End: 2023-04-26
Payer: MEDICAID

## 2023-04-26 ENCOUNTER — PATIENT MESSAGE (OUTPATIENT)
Dept: NEUROSURGERY | Facility: CLINIC | Age: 18
End: 2023-04-26
Payer: MEDICAID

## 2023-04-26 DIAGNOSIS — R29.898 UPPER EXTREMITY WEAKNESS: ICD-10-CM

## 2023-04-26 DIAGNOSIS — M35.7 SHOULDER JOINT HYPERMOBILITY: ICD-10-CM

## 2023-04-26 DIAGNOSIS — M62.89 MUSCLE HYPERTONICITY: Primary | ICD-10-CM

## 2023-04-26 PROCEDURE — 97110 THERAPEUTIC EXERCISES: CPT | Mod: PN

## 2023-04-26 NOTE — PROGRESS NOTES
"Physical Therapy Daily Treatment Note     Name: Chaitanya Ace  Clinic Number: [id number]    Therapy Diagnosis:   Encounter Diagnoses   Name Primary?    Muscle hypertonicity Yes    Shoulder joint hypermobility     Upper extremity weakness      Physician: Con Marques MD    Visit Date: 4/26/2023    Physician Orders: PT Eval and Treat   Medical Diagnosis from Referral:               M54.2 (ICD-10-CM) - Neck pain M54.12 (ICD-10-CM) - Cervical radiculopathy   Evaluation Date: 4/4/2023  Authorization Period Expiration: 4/2/2024  Plan of Care Expiration: 4/4/2023  Visit # / Visits authorized: 5/16     PN DUE: 5/4/2023  PTA Visit: 0/5    Time In: 1:30 PM  Time Out: 2:15 PM  Total Billable Time: 45 minutes    Precautions: Standard    Subjective   Pt reports: increased left sided neck pain and stiffness.     He was compliant with home exercise program.  Response to previous treatment: some soreness  Functional change: decreased pain     Pain:  Current 6/10, worst 10/10, best 3/10   Location:  bilateral upper trap pain (L>R) with radicular symptoms  Description: Burning; "shard of glass from head to upper trap that I can feel in my left sinus"  Aggravating Factors: erect sitting, overhead lifting  Easing Factors: hot shower, stretches    Objective     Objective Measures updated at progress report unless specified.     Treatment     Billing all therapy as therapeutic exercise per [place] Medicaid mary Tavares received therapeutic exercises to develop strength, endurance, ROM, flexibility, posture, and core stabilization for 25 minutes including:  UBE bike, 3/3'  Plinth push-ups 3 x 10  Free motion SA rows 13#, 3x10 (focus on eccentric control)   Free motion SA adduction 10# 2 x 10  Free motion SA extensions, 13#, 3x10  Free motion bicep curls 27# 3 x 10  Free motion SA Lat pulldowns 20#, 3x10  Free motion triceps extension 20# 3 x 10  (B) upper trap stretch, 3x30"  (B) levator scap stretch, 3x30"  Pectoralis stretch " "(corner/low), 3x30"     Chaitanya received the following manual therapy techniques: Joint mobilizations, Manual traction, Myofacial release, Soft tissue Mobilization, and Friction Massage were applied to the: cervical neck for 10 minutes, including:  Cervical traction with suboccipital release  STM to left cervical paraspinals, upper trap and levator scap  Palpation Assessment to determine the necessity for Functional Dry Needling of Left upper trapezius with electrical stimulation.   See EMR under MEDIA for written consent provided 4/4/2023.     Chaitanya participated in neuromuscular re-education activities to improve: Balance, Coordination, Kinesthetic, Sense, Proprioception, and Posture for 10 minutes. The following activities were included:  (B) shoulder ER against GTB, 3x10  (B) shoulder W's against RTB, 3x10  Standing Shoulder I's, Y's, T's against RTB, [id number]  Scapular wall push-ups 3 x 10   Wall clock RTB 2 x 5     Chaitanya participated in dynamic functional therapeutic activities to improve functional performance for 0  minutes, including:    Home Exercises Provided and Patient Education Provided     Education provided:   - rationale for treatment    Written Home Exercises Provided: Patient instructed to cont prior HEP.  Exercises were reviewed and Chaitanya was able to demonstrate them prior to the end of the session.  Chaitanya demonstrated good  understanding of the education provided.     See EMR under Patient Instructions for exercises provided prior visit.    Assessment   Pt tolerated therapy session well today. He presents with increased left neck pain today. Focused session on postural stabilization and scapular strengthening due to increased weakness. Requires cueing on control of motion and taking breaks throughout therapy session. Continues with increased difficulty with scapular retraction and scapular control. Ended session with soft tissue mobilization to left cervical paraspinals, upper trap, and levator scap with " much relief noted following. Continue to progress as tolerated. Will inquire response to treatment next session.    Chaitanya Is progressing well towards his goals.   Pt prognosis is Good.     Pt will continue to benefit from skilled outpatient physical therapy to address the deficits listed in the problem list box on initial evaluation, provide pt/family education and to maximize pt's level of independence in the home and community environment.     Pt's spiritual, cultural and educational needs considered and pt agreeable to plan of care and goals.     Anticipated barriers to physical therapy:     Goals:   Short Term Goals: 5 weeks   Patient will demonstrate independence with HEP in order to progress toward functional independence  Pt will demonstrate improved pain by reports of less than or equal to 7/10 worst pain on the verbal rating scale in order to progress toward maximal functional ability and improve QOL.  pt able to sit in upright seated position with less than 5/10 pain for improved QOL  pt will be able to lift greater than or equal to 10 pounds form floor to waist with proper body mechanics for increased ease with lifting activities.      Long Term Goals: 10 weeks   Patient will demonstrate improved function as indicated by a functional limitation score of  (will input once provided ) % on the FOTO.  Pt will demonstrate improved pain by reports of less than or equal to 5/10 worst pain on the verbal rating scale in order to progress toward maximal functional ability and improve QOL.  Pt will be able to correct slouch posture independently in static sitting for improved QOL  Pt will improve bilateral upper extremity strength to 5/5/ with no pain for increased strength needed to perform lifting tasks  pt will exhibit no pain with cervical range of motion in all planes for improved QOL and posture  pt will exhibit no pain with bilateral shoulder range of motion in all planes for improved QOL and posture   Pt will  be able to lift greater than or equal to 20 pounds from floor to waist for increased ease with lifting tasks   pt will report he has returned to the gym with less than 2/10 pain for return to prior level of function.        Plan     Plan of care Certification: 4/4/2023 to 7/4/2023.     Outpatient Physical Therapy 2 times weekly for 10 weeks to include the following interventions: Aquatic Therapy, Cervical/Lumbar Traction, Electrical Stimulation , Gait Training, Manual Therapy, Moist Heat/ Ice, Neuromuscular Re-ed, Orthotic Management and Training, Patient Education, Self Care, Therapeutic Activities, Therapeutic Exercise, and Ultrasound. And any other therapies and modalities as clinically indicated and appropriate, including but not limited to FDN and telehealth. Pt may be seen by PTA as a part of pt's care team.     Ailyn Castellano, PT, DPT  4/26/2023

## 2023-05-01 ENCOUNTER — CLINICAL SUPPORT (OUTPATIENT)
Dept: REHABILITATION | Facility: HOSPITAL | Age: 18
End: 2023-05-01
Payer: MEDICAID

## 2023-05-01 ENCOUNTER — PATIENT MESSAGE (OUTPATIENT)
Dept: NEUROSURGERY | Facility: CLINIC | Age: 18
End: 2023-05-01
Payer: MEDICAID

## 2023-05-01 DIAGNOSIS — R29.898 UPPER EXTREMITY WEAKNESS: ICD-10-CM

## 2023-05-01 DIAGNOSIS — M62.89 MUSCLE HYPERTONICITY: Primary | ICD-10-CM

## 2023-05-01 DIAGNOSIS — M35.7 SHOULDER JOINT HYPERMOBILITY: ICD-10-CM

## 2023-05-01 PROCEDURE — 97110 THERAPEUTIC EXERCISES: CPT | Mod: PN,CQ

## 2023-05-01 NOTE — PROGRESS NOTES
"Physical Therapy Daily Treatment Note     Name: Chaitanya Ace  Clinic Number: [id number]    Therapy Diagnosis:   Encounter Diagnoses   Name Primary?    Muscle hypertonicity Yes    Shoulder joint hypermobility     Upper extremity weakness      Physician: Con Marques MD    Visit Date: 5/1/2023    Physician Orders: PT Eval and Treat   Medical Diagnosis from Referral:               M54.2 (ICD-10-CM) - Neck pain M54.12 (ICD-10-CM) - Cervical radiculopathy   Evaluation Date: 4/4/2023  Authorization Period Expiration: 4/2/2024  Plan of Care Expiration: 4/4/2023  Visit # / Visits authorized: 5/16     PN DUE: 5/4/2023  PTA Visit: 1/5    Time In: 12:45 PM  Time Out: 1:30 PM  Total Billable Time: 45 minutes    Precautions: Standard    Subjective   Pt reports: he feels increased left sided neck pain and stiffness.     He was compliant with home exercise program.  Response to previous treatment: some soreness  Functional change: decreased pain     Pain:  Current 6/10, worst 10/10, best 3/10   Location:  bilateral upper trap pain (L>R) with radicular symptoms  Description: Burning; "shard of glass from head to upper trap that I can feel in my left sinus"  Aggravating Factors: erect sitting, overhead lifting  Easing Factors: hot shower, stretches    Objective     Objective Measures updated at progress report unless specified.     Treatment     Billing all therapy as therapeutic exercise per [place] Medicaid mary Tavares received therapeutic exercises to develop strength, endurance, ROM, flexibility, posture, and core stabilization for 45 minutes including:  UBE bike, 3/3'  Plinth push-ups 3 x 10  Free motion SA rows 17#, 3x10 (focus on eccentric control)   Free motion SA adduction 10# 2 x 10  Free motion SA extensions, 13#, 3x10  Free motion bicep curls 27# 3 x 10  Free motion SA Lat pulldowns 20#, 3x10  Free motion triceps extension 20# 3 x 10  (B) upper trap stretch, 3x30"  (B) levator scap stretch, 3x30"  Pectoralis " "stretch (corner/low), 3x30"  (B) shoulder ER against GTB, 3x10  (B) shoulder W's against RTB, 3x10  Standing Shoulder I's, Y's, T's against RTB, [id number]  Scapular wall push-ups 3 x 10   Wall clock RTB 2 x 5   TPR w/ movement to L UT, LS, and Scalenes     Chaitanya received the following manual therapy techniques: Joint mobilizations, Manual traction, Myofacial release, Soft tissue Mobilization, and Friction Massage were applied to the: cervical neck for 0 minutes, including:  Cervical traction with suboccipital release  STM to left cervical paraspinals, upper trap and levator scap  Palpation Assessment to determine the necessity for Functional Dry Needling of Left upper trapezius with electrical stimulation.   See EMR under MEDIA for written consent provided 4/4/2023.     Chaitanya participated in neuromuscular re-education activities to improve: Balance, Coordination, Kinesthetic, Sense, Proprioception, and Posture for 10 minutes. The following activities were included:  (B) shoulder ER against GTB, 3x10  (B) shoulder W's against RTB, 3x10  Standing Shoulder I's, Y's, T's against RTB, [id number]  Scapular wall push-ups 3 x 10   Wall clock RTB 2 x 5     Chaitanya participated in dynamic functional therapeutic activities to improve functional performance for 0  minutes, including:    Home Exercises Provided and Patient Education Provided     Education provided:   - rationale for treatment    Written Home Exercises Provided: Patient instructed to cont prior HEP.  Exercises were reviewed and Chaitanya was able to demonstrate them prior to the end of the session.  Chaitanya demonstrated good  understanding of the education provided.     See EMR under Patient Instructions for exercises provided prior visit.    Assessment   Pt tolerated therapy session well today. He presents with increased left neck pain today. Focused session on postural stabilization and scapular strengthening due to increased weakness. Performed manual TPR w/ movement to UT, " scalene, and LS. Continue to progress as tolerated. Will inquire response to treatment next session.    Chaitanya Is progressing well towards his goals.   Pt prognosis is Good.     Pt will continue to benefit from skilled outpatient physical therapy to address the deficits listed in the problem list box on initial evaluation, provide pt/family education and to maximize pt's level of independence in the home and community environment.     Pt's spiritual, cultural and educational needs considered and pt agreeable to plan of care and goals.     Anticipated barriers to physical therapy:     Goals:   Short Term Goals: 5 weeks   Patient will demonstrate independence with HEP in order to progress toward functional independence  Pt will demonstrate improved pain by reports of less than or equal to 7/10 worst pain on the verbal rating scale in order to progress toward maximal functional ability and improve QOL.  pt able to sit in upright seated position with less than 5/10 pain for improved QOL  pt will be able to lift greater than or equal to 10 pounds form floor to waist with proper body mechanics for increased ease with lifting activities.      Long Term Goals: 10 weeks   Patient will demonstrate improved function as indicated by a functional limitation score of  (will input once provided ) % on the FOTO.  Pt will demonstrate improved pain by reports of less than or equal to 5/10 worst pain on the verbal rating scale in order to progress toward maximal functional ability and improve QOL.  Pt will be able to correct slouch posture independently in static sitting for improved QOL  Pt will improve bilateral upper extremity strength to 5/5/ with no pain for increased strength needed to perform lifting tasks  pt will exhibit no pain with cervical range of motion in all planes for improved QOL and posture  pt will exhibit no pain with bilateral shoulder range of motion in all planes for improved QOL and posture   Pt will be able to  lift greater than or equal to 20 pounds from floor to waist for increased ease with lifting tasks   pt will report he has returned to the gym with less than 2/10 pain for return to prior level of function.        Plan     Plan of care Certification: 4/4/2023 to 7/4/2023.     Outpatient Physical Therapy 2 times weekly for 10 weeks to include the following interventions: Aquatic Therapy, Cervical/Lumbar Traction, Electrical Stimulation , Gait Training, Manual Therapy, Moist Heat/ Ice, Neuromuscular Re-ed, Orthotic Management and Training, Patient Education, Self Care, Therapeutic Activities, Therapeutic Exercise, and Ultrasound. And any other therapies and modalities as clinically indicated and appropriate, including but not limited to FDN and telehealth. Pt may be seen by PTA as a part of pt's care team.     Jay Jay Barnes, CAITIE  5/1/2023

## 2023-05-03 ENCOUNTER — CLINICAL SUPPORT (OUTPATIENT)
Dept: REHABILITATION | Facility: HOSPITAL | Age: 18
End: 2023-05-03
Payer: MEDICAID

## 2023-05-03 DIAGNOSIS — M35.7 SHOULDER JOINT HYPERMOBILITY: ICD-10-CM

## 2023-05-03 DIAGNOSIS — R29.898 UPPER EXTREMITY WEAKNESS: ICD-10-CM

## 2023-05-03 DIAGNOSIS — M62.89 MUSCLE HYPERTONICITY: Primary | ICD-10-CM

## 2023-05-03 PROCEDURE — 97110 THERAPEUTIC EXERCISES: CPT | Mod: PN

## 2023-05-03 NOTE — PROGRESS NOTES
"Physical Therapy Daily Treatment Note     Name: Chaitanya Ace  Clinic Number: [id number]    Therapy Diagnosis:   Encounter Diagnoses   Name Primary?    Muscle hypertonicity Yes    Shoulder joint hypermobility     Upper extremity weakness      Physician: Con Marques MD    Visit Date: 5/3/2023    Physician Orders: PT Eval and Treat   Medical Diagnosis from Referral:               M54.2 (ICD-10-CM) - Neck pain M54.12 (ICD-10-CM) - Cervical radiculopathy   Evaluation Date: 4/4/2023  Authorization Period Expiration: 4/2/2024  Plan of Care Expiration: 4/4/2023  Visit # / Visits authorized: 5/16     PN DUE: 5/4/2023  PTA Visit: 1/5    Time In: 12:45 PM  Time Out: 1:30 PM  Total Billable Time: 45 minutes    Precautions: Standard    Subjective   Pt reports: increased soreness due to doing push-ups yesterday.     He was compliant with home exercise program.  Response to previous treatment: some soreness  Functional change: decreased pain     Pain:  Current 6/10, worst 10/10, best 3/10   Location:  bilateral upper trap pain (L>R) with radicular symptoms  Description: Burning; "shard of glass from head to upper trap that I can feel in my left sinus"  Aggravating Factors: erect sitting, overhead lifting  Easing Factors: hot shower, stretches    Objective     Objective Measures updated at progress report unless specified.     Treatment     Billing all therapy as therapeutic exercise per [place] Medicaid guidjudi Tavares received therapeutic exercises to develop strength, endurance, ROM, flexibility, posture, and core stabilization for 45 minutes including:  UBE bike, 3/3'  Chair push-ups 3 x 10  Free motion SA rows 17#, 3x10 (focus on eccentric control)   Free motion SA adduction 10# 3 x 10  Free motion SA extensions, 13#, 3x10  Free motion bicep curls 27# 3 x 10  Free motion SA Lat pulldowns 20#, 3x10  Free motion triceps extension 20# 3 x 10  (B) shoulder ER against GTB, 3x10  Standing Shoulder I's, Y's, T's against RTB, " [id number]  TPR w/ movement to L UT, LS, and Scalenes     Chaitanya received the following manual therapy techniques: Joint mobilizations, Manual traction, Myofacial release, Soft tissue Mobilization, and Friction Massage were applied to the: cervical neck for 0 minutes, including:  Cervical traction with suboccipital release  STM to left cervical paraspinals, upper trap and levator scap  Palpation Assessment to determine the necessity for Functional Dry Needling of Left upper trapezius with electrical stimulation.   See EMR under MEDIA for written consent provided 4/4/2023.     Chaitanya participated in neuromuscular re-education activities to improve: Balance, Coordination, Kinesthetic, Sense, Proprioception, and Posture for 10 minutes. The following activities were included:  (B) shoulder ER against GTB, 3x10  (B) shoulder W's against RTB, 3x10  Standing Shoulder I's, Y's, T's against RTB, [id number]  Scapular wall push-ups 3 x 10   Wall clock RTB 2 x 5     Chaitanya participated in dynamic functional therapeutic activities to improve functional performance for 0  minutes, including:    Home Exercises Provided and Patient Education Provided     Education provided:   - rationale for treatment    Written Home Exercises Provided: Patient instructed to cont prior HEP.  Exercises were reviewed and Chaitanya was able to demonstrate them prior to the end of the session.  Chaitanya demonstrated good  understanding of the education provided.     See EMR under Patient Instructions for exercises provided prior visit.    Assessment   Pt tolerated therapy session well today. He presents with increased soreness today. Focused session on postural stabilization and scapular strengthening due to increased weakness. Ended session with TPR to left upper trap. Continue to progress as tolerated. Will inquire response to treatment next session.    Chaitanya Is progressing well towards his goals.   Pt prognosis is Good.     Pt will continue to benefit from skilled  outpatient physical therapy to address the deficits listed in the problem list box on initial evaluation, provide pt/family education and to maximize pt's level of independence in the home and community environment.     Pt's spiritual, cultural and educational needs considered and pt agreeable to plan of care and goals.     Anticipated barriers to physical therapy:     Goals:   Short Term Goals: 5 weeks   Patient will demonstrate independence with HEP in order to progress toward functional independence  Pt will demonstrate improved pain by reports of less than or equal to 7/10 worst pain on the verbal rating scale in order to progress toward maximal functional ability and improve QOL.  pt able to sit in upright seated position with less than 5/10 pain for improved QOL  pt will be able to lift greater than or equal to 10 pounds form floor to waist with proper body mechanics for increased ease with lifting activities.      Long Term Goals: 10 weeks   Patient will demonstrate improved function as indicated by a functional limitation score of  (will input once provided ) % on the FOTO.  Pt will demonstrate improved pain by reports of less than or equal to 5/10 worst pain on the verbal rating scale in order to progress toward maximal functional ability and improve QOL.  Pt will be able to correct slouch posture independently in static sitting for improved QOL  Pt will improve bilateral upper extremity strength to 5/5/ with no pain for increased strength needed to perform lifting tasks  pt will exhibit no pain with cervical range of motion in all planes for improved QOL and posture  pt will exhibit no pain with bilateral shoulder range of motion in all planes for improved QOL and posture   Pt will be able to lift greater than or equal to 20 pounds from floor to waist for increased ease with lifting tasks   pt will report he has returned to the gym with less than 2/10 pain for return to prior level of function.        Plan      Plan of care Certification: 4/4/2023 to 7/4/2023.     Outpatient Physical Therapy 2 times weekly for 10 weeks to include the following interventions: Aquatic Therapy, Cervical/Lumbar Traction, Electrical Stimulation , Gait Training, Manual Therapy, Moist Heat/ Ice, Neuromuscular Re-ed, Orthotic Management and Training, Patient Education, Self Care, Therapeutic Activities, Therapeutic Exercise, and Ultrasound. And any other therapies and modalities as clinically indicated and appropriate, including but not limited to FDN and telehealth. Pt may be seen by PTA as a part of pt's care team.     Ailyn Castellano, PT, DPT  5/3/2023

## 2023-05-08 ENCOUNTER — PATIENT MESSAGE (OUTPATIENT)
Dept: NEUROSURGERY | Facility: CLINIC | Age: 18
End: 2023-05-08
Payer: MEDICAID

## 2023-05-08 ENCOUNTER — CLINICAL SUPPORT (OUTPATIENT)
Dept: REHABILITATION | Facility: HOSPITAL | Age: 18
End: 2023-05-08
Payer: MEDICAID

## 2023-05-08 DIAGNOSIS — R29.898 UPPER EXTREMITY WEAKNESS: ICD-10-CM

## 2023-05-08 DIAGNOSIS — M35.7 SHOULDER JOINT HYPERMOBILITY: ICD-10-CM

## 2023-05-08 DIAGNOSIS — M62.89 MUSCLE HYPERTONICITY: Primary | ICD-10-CM

## 2023-05-08 PROCEDURE — 97140 MANUAL THERAPY 1/> REGIONS: CPT | Mod: PN

## 2023-05-08 PROCEDURE — 97112 NEUROMUSCULAR REEDUCATION: CPT | Mod: PN

## 2023-05-08 PROCEDURE — 97110 THERAPEUTIC EXERCISES: CPT | Mod: PN

## 2023-05-08 NOTE — PROGRESS NOTES
"Physical Therapy Daily Treatment Note     Name: Chaitanya Ace  Clinic Number: [id number]    Therapy Diagnosis:   Encounter Diagnoses   Name Primary?    Muscle hypertonicity Yes    Shoulder joint hypermobility     Upper extremity weakness      Physician: Con Marques MD    Visit Date: 5/8/2023    Physician Orders: PT Eval and Treat   Medical Diagnosis from Referral:               M54.2 (ICD-10-CM) - Neck pain M54.12 (ICD-10-CM) - Cervical radiculopathy   Evaluation Date: 4/4/2023  Authorization Period Expiration: 4/2/2024  Plan of Care Expiration: 4/4/2023  Visit # / Visits authorized: 8/16     PN DUE: 5/4/2023  PTA Visit: 0/5    Time In: 12:45 PM  Time Out: 1:45 PM  Total Billable Time: 60 minutes    Precautions: Standard    Subjective   Pt reports: no pain since last therapy session.     He was compliant with home exercise program.  Response to previous treatment: no pain, no soreness  Functional change: decreased pain     Pain:  Current 6/10, worst 10/10, best 3/10   Location:  bilateral upper trap pain (L>R) with radicular symptoms  Description: Burning; "shard of glass from head to upper trap that I can feel in my left sinus"  Aggravating Factors: erect sitting, overhead lifting  Easing Factors: hot shower, stretches    Objective     Objective Measures updated at progress report unless specified.     Treatment     Billing all therapy as therapeutic exercise per [place] Medicaid mary Tavares received therapeutic exercises to develop strength, endurance, ROM, flexibility, posture, and core stabilization for 37 minutes including:  UBE bike for increased UE ROM, strength and endurance, 3/3'  Chair push-ups 3 x 10  Free motion SA rows 17#, 3x10 (focus on eccentric control)   Free motion SA adduction 10# 3 x 10  Free motion SA extensions, 13#, 3x10  Free motion bicep curls 27# 3 x 10  Free motion SA Lat pulldowns 20#, 3x10  Free motion triceps extension 20# 3 x 10  Foam roll Abduction/flexion 10" x 10     Chaitanya " received the following manual therapy techniques: Joint mobilizations, Manual traction, Myofacial release, Soft tissue Mobilization, and Friction Massage were applied to the: cervical neck for 8 minutes, including:  Cervical traction with suboccipital release  STM to left cervical paraspinals, upper trap and levator scap  TPR w/ movement to L UT, LS, and Scalenes  Palpation Assessment to determine the necessity for Functional Dry Needling of Left upper trapezius with electrical stimulation.   See EMR under MEDIA for written consent provided 4/4/2023.     Chaitanya participated in neuromuscular re-education activities to improve: Balance, Coordination, Kinesthetic, Sense, Proprioception, and Posture for 15 minutes. The following activities were included:  (B) shoulder ER against GTB, 3x10  (B) shoulder W's against RTB, 3x10  Standing Shoulder I's, Y's, T's against RTB, [id number]  Scapular wall push-ups 3 x 10   Wall clock RTB 2 x 5     Chaitanya participated in dynamic functional therapeutic activities to improve functional performance for 0  minutes, including:    Home Exercises Provided and Patient Education Provided     Education provided:   - rationale for treatment    Written Home Exercises Provided: Patient instructed to cont prior HEP.  Exercises were reviewed and Chaitanya was able to demonstrate them prior to the end of the session.  Chaitanya demonstrated good  understanding of the education provided.     See EMR under Patient Instructions for exercises provided prior visit.    Assessment   Pt tolerated therapy session well today. He presents with no pain today and since last therapy session. Focused session on postural stabilization and scapular strengthening due to increased weakness. Continue to give cueing on proper form for decreased upper trap compensation. Increased control of motion Ended session with TPR to left upper trap. Continue to progress as tolerated. Will inquire response to treatment next session. Plan to  discharge at end of next week.     Chaitanya Is progressing well towards his goals.   Pt prognosis is Good.     Pt will continue to benefit from skilled outpatient physical therapy to address the deficits listed in the problem list box on initial evaluation, provide pt/family education and to maximize pt's level of independence in the home and community environment.     Pt's spiritual, cultural and educational needs considered and pt agreeable to plan of care and goals.     Anticipated barriers to physical therapy:     Goals:   Short Term Goals: 5 weeks   Patient will demonstrate independence with HEP in order to progress toward functional independence  Pt will demonstrate improved pain by reports of less than or equal to 7/10 worst pain on the verbal rating scale in order to progress toward maximal functional ability and improve QOL.  pt able to sit in upright seated position with less than 5/10 pain for improved QOL  pt will be able to lift greater than or equal to 10 pounds form floor to waist with proper body mechanics for increased ease with lifting activities.      Long Term Goals: 10 weeks   Patient will demonstrate improved function as indicated by a functional limitation score of  (will input once provided ) % on the FOTO.  Pt will demonstrate improved pain by reports of less than or equal to 5/10 worst pain on the verbal rating scale in order to progress toward maximal functional ability and improve QOL.  Pt will be able to correct slouch posture independently in static sitting for improved QOL  Pt will improve bilateral upper extremity strength to 5/5/ with no pain for increased strength needed to perform lifting tasks  pt will exhibit no pain with cervical range of motion in all planes for improved QOL and posture  pt will exhibit no pain with bilateral shoulder range of motion in all planes for improved QOL and posture   Pt will be able to lift greater than or equal to 20 pounds from floor to waist for  increased ease with lifting tasks   pt will report he has returned to the gym with less than 2/10 pain for return to prior level of function.        Plan     Plan of care Certification: 4/4/2023 to 7/4/2023.     Outpatient Physical Therapy 2 times weekly for 10 weeks to include the following interventions: Aquatic Therapy, Cervical/Lumbar Traction, Electrical Stimulation , Gait Training, Manual Therapy, Moist Heat/ Ice, Neuromuscular Re-ed, Orthotic Management and Training, Patient Education, Self Care, Therapeutic Activities, Therapeutic Exercise, and Ultrasound. And any other therapies and modalities as clinically indicated and appropriate, including but not limited to FDN and telehealth. Pt may be seen by PTA as a part of pt's care team.     Ailyn Castellano, PT, DPT  5/8/2023

## 2023-05-10 ENCOUNTER — CLINICAL SUPPORT (OUTPATIENT)
Dept: REHABILITATION | Facility: HOSPITAL | Age: 18
End: 2023-05-10
Payer: MEDICAID

## 2023-05-10 DIAGNOSIS — M35.7 SHOULDER JOINT HYPERMOBILITY: ICD-10-CM

## 2023-05-10 DIAGNOSIS — R29.898 UPPER EXTREMITY WEAKNESS: ICD-10-CM

## 2023-05-10 DIAGNOSIS — M62.89 MUSCLE HYPERTONICITY: Primary | ICD-10-CM

## 2023-05-10 PROCEDURE — 97110 THERAPEUTIC EXERCISES: CPT | Mod: PN,CQ

## 2023-05-10 NOTE — PROGRESS NOTES
"Physical Therapy Daily Treatment Note     Name: Chaitanya Ace  Clinic Number: [id number]    Therapy Diagnosis:   Encounter Diagnoses   Name Primary?    Muscle hypertonicity Yes    Shoulder joint hypermobility     Upper extremity weakness      Physician: Con Marques MD    Visit Date: 5/10/2023    Physician Orders: PT Eval and Treat   Medical Diagnosis from Referral:               M54.2 (ICD-10-CM) - Neck pain M54.12 (ICD-10-CM) - Cervical radiculopathy   Evaluation Date: 4/4/2023  Authorization Period Expiration: 4/2/2024  Plan of Care Expiration: 4/4/2023  Visit # / Visits authorized: 8/16     PN DUE: 5/4/2023  PTA Visit: 1/5    Time In: 12:45 PM  Time Out: 1:30 PM  Total Billable Time: 45 minutes    Precautions: Standard    Subjective   Pt reports: sleeping in a chair last night and now has some mild to moderate L sided neck pain.    He was compliant with home exercise program.  Response to previous treatment: no pain, no soreness  Functional change: decreased pain     Pain:  Current 6/10, worst 10/10, best 3/10   Location:  bilateral upper trap pain (L>R) with radicular symptoms  Description: Burning; "shard of glass from head to upper trap that I can feel in my left sinus"  Aggravating Factors: erect sitting, overhead lifting  Easing Factors: hot shower, stretches    Objective     Objective Measures updated at progress report unless specified.     Treatment     Billing all therapy as therapeutic exercise per [place] Medicaid mary Tavares received therapeutic exercises to develop strength, endurance, ROM, flexibility, posture, and core stabilization for 25 minutes including:  UBE bike for increased UE ROM, strength and endurance, 3/3'  Chair push-ups 3 x 10  Free motion SA rows 20#, 3x10 (focus on eccentric control)   Free motion SA adduction 13# 3 x 10  Free motion SA extensions, 13#, 3x10  Free motion bicep curls 27# 3 x 10  Free motion SA Lat pulldowns 20#, 3x10  Free motion triceps extension 20# 3 x " "10  Foam roll Abduction/flexion 10" x 10     Chaitanya received the following manual therapy techniques: Joint mobilizations, Manual traction, Myofacial release, Soft tissue Mobilization, and Friction Massage were applied to the: cervical neck for 10 minutes, including:  Cervical traction with suboccipital release  STM to left cervical paraspinals, upper trap and levator scap  TPR w/ movement to L UT, LS, and Scalenes  Palpation Assessment to determine the necessity for Functional Dry Needling of Left upper trapezius with electrical stimulation.   See EMR under MEDIA for written consent provided 4/4/2023.     Chaitanya participated in neuromuscular re-education activities to improve: Balance, Coordination, Kinesthetic, Sense, Proprioception, and Posture for 10 minutes. The following activities were included:  (B) shoulder ER against GTB, 3x10  (B) shoulder W's against RTB, 3x10  Standing Shoulder I's, Y's, T's against GTB, [id number]  Scapular wall push-ups 3 x 10   Wall clock RTB 2 x 5   Prone I's, Y's, T's, W's, 5" holds, [id number]    Chaitanya participated in dynamic functional therapeutic activities to improve functional performance for 0  minutes, including:    Home Exercises Provided and Patient Education Provided     Education provided:   - rationale for treatment    Written Home Exercises Provided: Patient instructed to cont prior HEP.  Exercises were reviewed and Chaitanya was able to demonstrate them prior to the end of the session.  Chaitanya demonstrated good  understanding of the education provided.     See EMR under Patient Instructions for exercises provided prior visit.    Assessment   Pt tolerated therapy session well today. He presents with mild to moderate L sided neck pain today. Continued to focus session on postural stabilization and scapular strengthening due to increased weakness as well as decreasing muscle tension.  Added Prone I's, Y's, T's, W's for added scapular strengthening.   Ended session with TPR w/ movement to " left upper trap and LS. Continue to progress as tolerated. Will inquire response to treatment next session.     Chaitanya Is progressing well towards his goals.   Pt prognosis is Good.     Pt will continue to benefit from skilled outpatient physical therapy to address the deficits listed in the problem list box on initial evaluation, provide pt/family education and to maximize pt's level of independence in the home and community environment.     Pt's spiritual, cultural and educational needs considered and pt agreeable to plan of care and goals.     Anticipated barriers to physical therapy:     Goals:   Short Term Goals: 5 weeks   Patient will demonstrate independence with HEP in order to progress toward functional independence  Pt will demonstrate improved pain by reports of less than or equal to 7/10 worst pain on the verbal rating scale in order to progress toward maximal functional ability and improve QOL.  pt able to sit in upright seated position with less than 5/10 pain for improved QOL  pt will be able to lift greater than or equal to 10 pounds form floor to waist with proper body mechanics for increased ease with lifting activities.      Long Term Goals: 10 weeks   Patient will demonstrate improved function as indicated by a functional limitation score of  (will input once provided ) % on the FOTO.  Pt will demonstrate improved pain by reports of less than or equal to 5/10 worst pain on the verbal rating scale in order to progress toward maximal functional ability and improve QOL.  Pt will be able to correct slouch posture independently in static sitting for improved QOL  Pt will improve bilateral upper extremity strength to 5/5/ with no pain for increased strength needed to perform lifting tasks  pt will exhibit no pain with cervical range of motion in all planes for improved QOL and posture  pt will exhibit no pain with bilateral shoulder range of motion in all planes for improved QOL and posture   Pt will be  able to lift greater than or equal to 20 pounds from floor to waist for increased ease with lifting tasks   pt will report he has returned to the gym with less than 2/10 pain for return to prior level of function.        Plan     Plan of care Certification: 4/4/2023 to 7/4/2023.     Outpatient Physical Therapy 2 times weekly for 10 weeks to include the following interventions: Aquatic Therapy, Cervical/Lumbar Traction, Electrical Stimulation , Gait Training, Manual Therapy, Moist Heat/ Ice, Neuromuscular Re-ed, Orthotic Management and Training, Patient Education, Self Care, Therapeutic Activities, Therapeutic Exercise, and Ultrasound. And any other therapies and modalities as clinically indicated and appropriate, including but not limited to FDN and telehealth. Pt may be seen by PTA as a part of pt's care team.     Jay Jay Barnes PTA  5/10/2023

## 2023-05-15 ENCOUNTER — OFFICE VISIT (OUTPATIENT)
Dept: NEUROSURGERY | Facility: CLINIC | Age: 18
End: 2023-05-15
Payer: MEDICAID

## 2023-05-15 VITALS — SYSTOLIC BLOOD PRESSURE: 123 MMHG | HEART RATE: 87 BPM | DIASTOLIC BLOOD PRESSURE: 87 MMHG

## 2023-05-15 DIAGNOSIS — M54.2 CERVICALGIA: Primary | ICD-10-CM

## 2023-05-15 PROCEDURE — 3044F PR MOST RECENT HEMOGLOBIN A1C LEVEL <7.0%: ICD-10-PCS | Mod: CPTII,,, | Performed by: STUDENT IN AN ORGANIZED HEALTH CARE EDUCATION/TRAINING PROGRAM

## 2023-05-15 PROCEDURE — 99999 PR PBB SHADOW E&M-EST. PATIENT-LVL II: ICD-10-PCS | Mod: PBBFAC,,, | Performed by: STUDENT IN AN ORGANIZED HEALTH CARE EDUCATION/TRAINING PROGRAM

## 2023-05-15 PROCEDURE — 99203 OFFICE O/P NEW LOW 30 MIN: CPT | Mod: S$PBB,,, | Performed by: STUDENT IN AN ORGANIZED HEALTH CARE EDUCATION/TRAINING PROGRAM

## 2023-05-15 PROCEDURE — 1159F PR MEDICATION LIST DOCUMENTED IN MEDICAL RECORD: ICD-10-PCS | Mod: CPTII,,, | Performed by: STUDENT IN AN ORGANIZED HEALTH CARE EDUCATION/TRAINING PROGRAM

## 2023-05-15 PROCEDURE — 99999 PR PBB SHADOW E&M-EST. PATIENT-LVL II: CPT | Mod: PBBFAC,,, | Performed by: STUDENT IN AN ORGANIZED HEALTH CARE EDUCATION/TRAINING PROGRAM

## 2023-05-15 PROCEDURE — 3074F SYST BP LT 130 MM HG: CPT | Mod: CPTII,,, | Performed by: STUDENT IN AN ORGANIZED HEALTH CARE EDUCATION/TRAINING PROGRAM

## 2023-05-15 PROCEDURE — 3074F PR MOST RECENT SYSTOLIC BLOOD PRESSURE < 130 MM HG: ICD-10-PCS | Mod: CPTII,,, | Performed by: STUDENT IN AN ORGANIZED HEALTH CARE EDUCATION/TRAINING PROGRAM

## 2023-05-15 PROCEDURE — 3079F PR MOST RECENT DIASTOLIC BLOOD PRESSURE 80-89 MM HG: ICD-10-PCS | Mod: CPTII,,, | Performed by: STUDENT IN AN ORGANIZED HEALTH CARE EDUCATION/TRAINING PROGRAM

## 2023-05-15 PROCEDURE — 1159F MED LIST DOCD IN RCRD: CPT | Mod: CPTII,,, | Performed by: STUDENT IN AN ORGANIZED HEALTH CARE EDUCATION/TRAINING PROGRAM

## 2023-05-15 PROCEDURE — 3044F HG A1C LEVEL LT 7.0%: CPT | Mod: CPTII,,, | Performed by: STUDENT IN AN ORGANIZED HEALTH CARE EDUCATION/TRAINING PROGRAM

## 2023-05-15 PROCEDURE — 3079F DIAST BP 80-89 MM HG: CPT | Mod: CPTII,,, | Performed by: STUDENT IN AN ORGANIZED HEALTH CARE EDUCATION/TRAINING PROGRAM

## 2023-05-15 PROCEDURE — 99212 OFFICE O/P EST SF 10 MIN: CPT | Mod: PBBFAC | Performed by: STUDENT IN AN ORGANIZED HEALTH CARE EDUCATION/TRAINING PROGRAM

## 2023-05-15 PROCEDURE — 99203 PR OFFICE/OUTPT VISIT, NEW, LEVL III, 30-44 MIN: ICD-10-PCS | Mod: S$PBB,,, | Performed by: STUDENT IN AN ORGANIZED HEALTH CARE EDUCATION/TRAINING PROGRAM

## 2023-05-15 NOTE — PROGRESS NOTES
Neurosurgery  History & Physical    SUBJECTIVE:     Chief Complaint: Left neck/upper thoracic pain    History of Present Illness:  18 M presents for eval of 4 mo of left sided posterior neck and left parascapular pain.  There was no inciting trauma/falls.  He localizes pain to cervico thoracic jxn along the paraspinal region along the left side.  He has no radicular arm/leg pain.  He has occasional parasthesias in his UE/LE.  He denies dropping items from his hands, difficulty with buttons, or progressive weakness.  He has been to PT for his pain with some relief.  He notes that heating pads help alleviated the neck pain.  He has no gait instability.    Review of patient's allergies indicates:   Allergen Reactions    Sulfamethoxazole-trimethoprim Other (See Comments)       Current Outpatient Medications   Medication Sig Dispense Refill    meloxicam (MOBIC) 15 MG tablet Take 1 tablet (15 mg total) by mouth once daily. (Patient not taking: Reported on 5/15/2023) 30 tablet 0     No current facility-administered medications for this visit.       Past Medical History:   Diagnosis Date    ADHD     Tongue tie     Has had it cut     No past surgical history on file.  Family History       Problem Relation (Age of Onset)    Cancer Paternal Grandmother    Depression Paternal Grandfather    Diabetes Maternal Grandfather    Hypertension Maternal Grandfather    Rheum arthritis Mother          Social History     Socioeconomic History    Marital status: Single   Tobacco Use    Smoking status: Never    Smokeless tobacco: Never   Substance and Sexual Activity    Alcohol use: No    Drug use: Not Currently    Sexual activity: Not Currently       Review of Systems  14 point ROS was negative    OBJECTIVE:     Vital Signs  Pulse: 87  BP: 123/87  Pain Score:   5  There is no height or weight on file to calculate BMI.      Physical Exam:    Constitutional: He appears well-developed and well-nourished.     Eyes: Pupils are equal, round, and  reactive to light.     Cardiovascular: Normal rate and regular rhythm.     Abdominal: Soft.     Psych/Behavior: He is alert. He is oriented to person, place, and time. He has a normal mood and affect.     Musculoskeletal: Gait is normal.        Neck: Range of motion is limited.        Back: Range of motion is limited.        Right Upper Extremities: Muscle strength is 5/5.        Left Upper Extremities: Muscle strength is 5/5.       Right Lower Extremities: Muscle strength is 5/5.        Left Lower Extremities: Muscle strength is 5/5.     Neurological:        Coordination: He has a normal Romberg Test and normal tandem walking coordination.        Sensory: There is no sensory deficit in the trunk. There is no sensory deficit in the extremities.        DTRs: DTRs are DTRS NORMAL AND SYMMETRICnormal and symmetric.        Cranial nerves: Cranial nerve(s) II, III, IV, V, VI, VII, VIII, IX, X, XI and XII are intact.   No cordoba's    Moderate TTP along left medial parascapular area    Diagnostic Results:  MRI c spine: no significant central or NF stenosis throughout.  CT c spine: no acute fractures/subluxation.  Reviewed    ASSESSMENT/PLAN:     18 M with left sided neck pain for the past 4 mo without radicular component.  His imaging is described above and shows no stenosis or deformity.  He is intact.  I believe most of his pain is myofascial in origin.  Will plan to get dynamic xrays to rule out instability.  -Flex/ex c spine  -Continue PT  -Fu after PT completed

## 2023-05-17 ENCOUNTER — HOSPITAL ENCOUNTER (OUTPATIENT)
Dept: RADIOLOGY | Facility: HOSPITAL | Age: 18
Discharge: HOME | End: 2023-05-17
Attending: STUDENT IN AN ORGANIZED HEALTH CARE EDUCATION/TRAINING PROGRAM
Payer: MEDICAID

## 2023-05-17 ENCOUNTER — CLINICAL SUPPORT (OUTPATIENT)
Dept: REHABILITATION | Facility: HOSPITAL | Age: 18
End: 2023-05-17
Payer: MEDICAID

## 2023-05-17 DIAGNOSIS — R29.898 UPPER EXTREMITY WEAKNESS: ICD-10-CM

## 2023-05-17 DIAGNOSIS — M62.89 MUSCLE HYPERTONICITY: Primary | ICD-10-CM

## 2023-05-17 DIAGNOSIS — M54.2 CERVICALGIA: ICD-10-CM

## 2023-05-17 DIAGNOSIS — M35.7 SHOULDER JOINT HYPERMOBILITY: ICD-10-CM

## 2023-05-17 PROCEDURE — 97110 THERAPEUTIC EXERCISES: CPT | Mod: PN

## 2023-05-17 NOTE — PROGRESS NOTES
"Physical Therapy Daily Treatment Note     Name: Chaitanya Ace  Clinic Number: [id number]    Therapy Diagnosis:   Encounter Diagnoses   Name Primary?    Muscle hypertonicity Yes    Shoulder joint hypermobility     Upper extremity weakness      Physician: Con Marques MD    Visit Date: 5/17/2023    Physician Orders: PT Eval and Treat   Medical Diagnosis from Referral:               M54.2 (ICD-10-CM) - Neck pain M54.12 (ICD-10-CM) - Cervical radiculopathy   Evaluation Date: 4/4/2023  Authorization Period Expiration: 4/2/2024  Plan of Care Expiration: 7/4/2023  Visit # / Visits authorized: 8/16     PN DUE: 6/17/2023  PTA Visit: 0/5    Time In: 12:45 PM  Time Out: 1:30 PM  Total Billable Time: 45 minutes    Precautions: Standard    Subjective   Pt reports: he is feeling better, but continues with upper trap tightness. He reports performing many repetitions of push-ups at home due to decreased pain with performing.     He was compliant with home exercise program.  Response to previous treatment: no pain, no soreness  Functional change: decreased pain     Pain:  Current 6/10, worst 10/10, best 3/10   Location:  bilateral upper trap pain (L>R) with radicular symptoms  Description: Burning; "shard of glass from head to upper trap that I can feel in my left sinus"  Aggravating Factors: erect sitting, overhead lifting  Easing Factors: hot shower, stretches    Objective     Objective Measures updated at progress report unless specified.     Cervical Range of Motion: Pt has hypermobility with all planes of motion. Increased pain with bilateral rotation and bilateral lateral side bending.     Shoulder Range of Motion:  Pt exhibits hypermobility with all planes of motion with no pain reported.     Upper Extremity Strength:   (R) UE   (L) UE     Shoulder flexion: 5/5 Shoulder flexion: 5/5   Shoulder Abduction: 5/5 Shoulder abduction: 5/5   Shoulder ER 5/5 Shoulder ER 5/5   Shoulder IR 5/5 Shoulder IR 5/5   Elbow flexion: " "5/5 Elbow flexion: 5/5   Elbow extension: 5/5 Elbow extension: 5/5    5/5 : 5/5   Lower Trap 5/5 Lower Trap 5/5   Middle Trap 5/5 Middle Trap 5/5   Rhomboids 5/5 Rhomboids 5/5      Function: FOTO        Treatment     Billing all therapy as therapeutic exercise per [place] Medicaid guidlines    Chaitanya received therapeutic exercises to develop strength, endurance, ROM, flexibility, posture, and core stabilization for 20 minutes including:  Test and measures for progress note  UBE bike for increased UE ROM, strength and endurance, 3/3'  Chair push-ups 3 x 10  Free motion SA rows 20#, 3x10 (focus on eccentric control)   Free motion SA adduction 13# 3 x 10  Free motion SA extensions, 13#, 3x10  Free motion bicep curls 27# 3 x 10  Free motion triceps extension 20# 3 x 10  Foam roll Abduction/flexion 10" x 10     Chaitanya received the following manual therapy techniques: Joint mobilizations, Manual traction, Myofacial release, Soft tissue Mobilization, and Friction Massage were applied to the: cervical neck for 10 minutes, including:  Cervical traction with suboccipital release  STM to left cervical paraspinals, upper trap and levator scap  TPR w/ movement to L UT, LS, and Scalenes  Palpation Assessment to determine the necessity for Functional Dry Needling of Left upper trapezius with electrical stimulation.   See EMR under MEDIA for written consent provided 4/4/2023.     Chaitanya participated in neuromuscular re-education activities to improve: Balance, Coordination, Kinesthetic, Sense, Proprioception, and Posture for 15 minutes. The following activities were included:  Plank lateral walk on 3 in step 2 x 10  Shoulder taps (high plank) 3 x 15  Plank up and downs 2 x 10  Plank cross over taps 2 x 10  (B) shoulder ER against GTB, 3x10  (B) shoulder W's against RTB, 3x10  Standing Shoulder I's, Y's, T's against GTB, [id number]  Scapular wall push-ups 3 x 10   Wall clock RTB 2 x 5   Prone I's, Y's, T's, W's, 5" holds, [id number]    Chaitanya " participated in dynamic functional therapeutic activities to improve functional performance for 0  minutes, including:    Home Exercises Provided and Patient Education Provided     Education provided:   - rationale for treatment    Written Home Exercises Provided: Patient instructed to cont prior HEP.  Exercises were reviewed and Chaitanya was able to demonstrate them prior to the end of the session.  Chaitanya demonstrated good  understanding of the education provided.     See EMR under Patient Instructions for exercises provided prior visit.    Assessment   Pt tolerated therapy session good today. He presents with mild to moderate L sided neck pain today. Test and measures taken and recorded above under the objective section. He exhibits full strength in bilateral upper extremities with no pain. He continues to exhibit hypermobility of shoulders and cervical spine and pain with bilateral rotation and bilateral lateral side bending. He as well shows improved functional mobility noted in FOTO outcome score. At this time he has met 8 out od 12 goals. Continued to focus session on postural stabilization and scapular strengthening due to increased weakness as well as decreasing muscle tension.  Added more stabilization activities with increased fatigue noted. Following verbal consent, FDN to right upper trap was applied followed with TPR w/ movement to left upper trap and LS. Continue to progress as tolerated. Will inquire response to treatment next session.     Plan to discharge at end of next week.     Chaitanya Is progressing well towards his goals.   Pt prognosis is Good.     Pt will continue to benefit from skilled outpatient physical therapy to address the deficits listed in the problem list box on initial evaluation, provide pt/family education and to maximize pt's level of independence in the home and community environment.     Pt's spiritual, cultural and educational needs considered and pt agreeable to plan of care and  goals.     Anticipated barriers to physical therapy:     Goals:   Short Term Goals: 5 weeks   Patient will demonstrate independence with HEP in order to progress toward functional independence- MET  Pt will demonstrate improved pain by reports of less than or equal to 7/10 worst pain on the verbal rating scale in order to progress toward maximal functional ability and improve QOL.- MET  pt able to sit in upright seated position with less than 5/10 pain for improved QOL- MET  pt will be able to lift greater than or equal to 10 pounds form floor to waist with proper body mechanics for increased ease with lifting activities. - MET     Long Term Goals: 10 weeks   Patient will demonstrate improved function as indicated by a functional limitation score of 31 % on the FOTO.- progressing, not met  Pt will demonstrate improved pain by reports of less than or equal to 5/10 worst pain on the verbal rating scale in order to progress toward maximal functional ability and improve QOL.- progressing, not met  Pt will be able to correct slouch posture independently in static sitting for improved QOL- MET  Pt will improve bilateral upper extremity strength to 5/5/ with no pain for increased strength needed to perform lifting tasks- MET  pt will exhibit no pain with cervical range of motion in all planes for improved QOL and posture- progressing, not met  pt will exhibit no pain with bilateral shoulder range of motion in all planes for improved QOL and posture - MET  Pt will be able to lift greater than or equal to 20 pounds from floor to waist for increased ease with lifting tasks - progressing, not met  pt will report he has returned to the gym with less than 2/10 pain for return to prior level of function. - progressing, not met       Plan     Plan of care Certification: 4/4/2023 to 7/4/2023.     Outpatient Physical Therapy 2 times weekly for 10 weeks to include the following interventions: Aquatic Therapy, Cervical/Lumbar  Traction, Electrical Stimulation , Gait Training, Manual Therapy, Moist Heat/ Ice, Neuromuscular Re-ed, Orthotic Management and Training, Patient Education, Self Care, Therapeutic Activities, Therapeutic Exercise, and Ultrasound. And any other therapies and modalities as clinically indicated and appropriate, including but not limited to FDN and telehealth. Pt may be seen by PTA as a part of pt's care team.     Ailyn Castellano, PT, DPT  5/17/2023

## 2023-05-23 ENCOUNTER — CLINICAL SUPPORT (OUTPATIENT)
Dept: REHABILITATION | Facility: HOSPITAL | Age: 18
End: 2023-05-23
Payer: MEDICAID

## 2023-05-23 DIAGNOSIS — M35.7 SHOULDER JOINT HYPERMOBILITY: ICD-10-CM

## 2023-05-23 DIAGNOSIS — R29.898 UPPER EXTREMITY WEAKNESS: ICD-10-CM

## 2023-05-23 DIAGNOSIS — M62.89 MUSCLE HYPERTONICITY: Primary | ICD-10-CM

## 2023-05-23 PROCEDURE — 97110 THERAPEUTIC EXERCISES: CPT | Mod: PN,CQ

## 2023-05-23 NOTE — PROGRESS NOTES
"Physical Therapy Daily Treatment Note     Name: Chaitanya Ace  Clinic Number: [id number]    Therapy Diagnosis:   Encounter Diagnoses   Name Primary?    Muscle hypertonicity Yes    Shoulder joint hypermobility     Upper extremity weakness      Physician: Con Marques MD    Visit Date: 5/23/2023    Physician Orders: PT Eval and Treat   Medical Diagnosis from Referral:               M54.2 (ICD-10-CM) - Neck pain M54.12 (ICD-10-CM) - Cervical radiculopathy   Evaluation Date: 4/4/2023  Authorization Period Expiration: 4/2/2024  Plan of Care Expiration: 7/4/2023  Visit # / Visits authorized: 10/16     PN DUE: 6/17/2023  PTA Visit: 1/5    Time In: 12:45 PM  Time Out: 1:30 PM  Total Billable Time: 45 minutes    Precautions: Standard    Subjective   Pt reports: feeling better overall.     He was compliant with home exercise program.  Response to previous treatment: no pain, no soreness  Functional change: decreased pain     Pain:  Current 6/10, worst 10/10, best 3/10   Location:  bilateral upper trap pain (L>R) with radicular symptoms  Description: Burning; "shard of glass from head to upper trap that I can feel in my left sinus"  Aggravating Factors: erect sitting, overhead lifting  Easing Factors: hot shower, stretches    Objective     Objective Measures updated at progress report unless specified.        Treatment     Billing all therapy as therapeutic exercise per [place] Medicaid guidlines    Chaitanya received therapeutic exercises to develop strength, endurance, ROM, flexibility, posture, and core stabilization for 20 minutes including:  UBE bike for increased UE ROM, strength and endurance, 3/3'  Chair push-ups 3 x 10  Free motion SA rows 20#, 3x10 (focus on eccentric control)   Free motion SA adduction 13# 3 x 10  Free motion SA extensions, 13-17#, 3x10  Free motion bicep curls 27# 3 x 10  Free motion triceps extension 20# 3 x 10  Foam roll Abduction/flexion 10" x 10     Chaitanya received the following manual therapy " "techniques: Joint mobilizations, Manual traction, Myofacial release, Soft tissue Mobilization, and Friction Massage were applied to the: cervical neck for 0 minutes, including:  Cervical traction with suboccipital release  STM to left cervical paraspinals, upper trap and levator scap  TPR w/ movement to L UT, LS, and Scalenes  Palpation Assessment to determine the necessity for Functional Dry Needling of Left upper trapezius with electrical stimulation.   See EMR under MEDIA for written consent provided 4/4/2023.     Chaitanya participated in neuromuscular re-education activities to improve: Balance, Coordination, Kinesthetic, Sense, Proprioception, and Posture for 20 minutes. The following activities were included:  Plank lateral walk on 3 in step 2 x 10  Shoulder taps (high plank) 3 x 15  Plank up and downs 2 x 10  Plank cross over taps 2 x 10  (B) shoulder ER against GTB, 3x10  (B) shoulder W's against RTB, 3x10  Standing Shoulder I's, Y's, T's against GTB, [id number]  Scapular wall push-ups 3 x 10   Wall clock RTB 2 x 5   Prone I's, Y's, T's, W's, 5" holds, [id number]  Spidermans w/ lift off, RTB, 1x10      Chaitanya participated in dynamic functional therapeutic activities to improve functional performance for 0  minutes, including:    Home Exercises Provided and Patient Education Provided     Education provided:   - rationale for treatment    Written Home Exercises Provided: Patient instructed to cont prior HEP.  Exercises were reviewed and Chaitanya was able to demonstrate them prior to the end of the session.  Chaitanya demonstrated good  understanding of the education provided.     See EMR under Patient Instructions for exercises provided prior visit.    Assessment   Pt tolerated therapy session good today. Continued to focus session on postural and delta-scapular stabilization and strength. Continue to progress as able. Will inquire response to treatment next session.     Plan to discharge at end of next week.     Chaitanya Is " progressing well towards his goals.   Pt prognosis is Good.     Pt will continue to benefit from skilled outpatient physical therapy to address the deficits listed in the problem list box on initial evaluation, provide pt/family education and to maximize pt's level of independence in the home and community environment.     Pt's spiritual, cultural and educational needs considered and pt agreeable to plan of care and goals.     Anticipated barriers to physical therapy:     Goals:   Short Term Goals: 5 weeks   Patient will demonstrate independence with HEP in order to progress toward functional independence- MET  Pt will demonstrate improved pain by reports of less than or equal to 7/10 worst pain on the verbal rating scale in order to progress toward maximal functional ability and improve QOL.- MET  pt able to sit in upright seated position with less than 5/10 pain for improved QOL- MET  pt will be able to lift greater than or equal to 10 pounds form floor to waist with proper body mechanics for increased ease with lifting activities. - MET     Long Term Goals: 10 weeks   Patient will demonstrate improved function as indicated by a functional limitation score of 31 % on the FOTO.- progressing, not met  Pt will demonstrate improved pain by reports of less than or equal to 5/10 worst pain on the verbal rating scale in order to progress toward maximal functional ability and improve QOL.- progressing, not met  Pt will be able to correct slouch posture independently in static sitting for improved QOL- MET  Pt will improve bilateral upper extremity strength to 5/5/ with no pain for increased strength needed to perform lifting tasks- MET  pt will exhibit no pain with cervical range of motion in all planes for improved QOL and posture- progressing, not met  pt will exhibit no pain with bilateral shoulder range of motion in all planes for improved QOL and posture - MET  Pt will be able to lift greater than or equal to 20  pounds from floor to waist for increased ease with lifting tasks - progressing, not met  pt will report he has returned to the gym with less than 2/10 pain for return to prior level of function. - progressing, not met       Plan     Plan of care Certification: 4/4/2023 to 7/4/2023.     Outpatient Physical Therapy 2 times weekly for 10 weeks to include the following interventions: Aquatic Therapy, Cervical/Lumbar Traction, Electrical Stimulation , Gait Training, Manual Therapy, Moist Heat/ Ice, Neuromuscular Re-ed, Orthotic Management and Training, Patient Education, Self Care, Therapeutic Activities, Therapeutic Exercise, and Ultrasound. And any other therapies and modalities as clinically indicated and appropriate, including but not limited to FDN and telehealth. Pt may be seen by PTA as a part of pt's care team.     Jay Jay Barnes PTA  5/23/2023

## 2023-06-06 ENCOUNTER — CLINICAL SUPPORT (OUTPATIENT)
Dept: REHABILITATION | Facility: HOSPITAL | Age: 18
End: 2023-06-06
Payer: MEDICAID

## 2023-06-06 DIAGNOSIS — R29.898 UPPER EXTREMITY WEAKNESS: ICD-10-CM

## 2023-06-06 DIAGNOSIS — M35.7 SHOULDER JOINT HYPERMOBILITY: ICD-10-CM

## 2023-06-06 DIAGNOSIS — M62.89 MUSCLE HYPERTONICITY: Primary | ICD-10-CM

## 2023-06-06 PROCEDURE — 97110 THERAPEUTIC EXERCISES: CPT | Mod: PN

## 2023-06-06 NOTE — PROGRESS NOTES
"Physical Therapy Daily Treatment Note     Name: Chaitanya Ace  Clinic Number: [id number]    Therapy Diagnosis:   Encounter Diagnoses   Name Primary?    Muscle hypertonicity Yes    Shoulder joint hypermobility     Upper extremity weakness      Physician: Con Marques MD    Visit Date: 6/6/2023    Physician Orders: PT Eval and Treat   Medical Diagnosis from Referral:               M54.2 (ICD-10-CM) - Neck pain M54.12 (ICD-10-CM) - Cervical radiculopathy   Evaluation Date: 4/4/2023  Authorization Period Expiration: 4/2/2024  Plan of Care Expiration: 7/4/2023  Visit # / Visits authorized: 11/16     PN DUE: 6/17/2023  PTA Visit: 0/5    Time In: 3:30 PM  Time Out: 4:15 PM  Total Billable Time: 45 minutes    Precautions: Standard    Subjective   Pt reports: feeling better overall. Increased discomfort noted on right side greater than left today.     He was compliant with home exercise program.  Response to previous treatment: no pain, no soreness  Functional change: decreased pain     Pain:  Current 6/10, worst 10/10, best 3/10   Location:  bilateral upper trap pain (L>R) with radicular symptoms  Description: Burning; "shard of glass from head to upper trap that I can feel in my left sinus"  Aggravating Factors: erect sitting, overhead lifting  Easing Factors: hot shower, stretches    Objective     Objective Measures updated at progress report unless specified.     Treatment     Billing all therapy as therapeutic exercise per [place] Medicaid mary Tavares received therapeutic exercises to develop strength, endurance, ROM, flexibility, posture, and core stabilization for 17 minutes including:  UBE bike for increased UE ROM, strength and endurance, 3/3'  Chair push-ups 3 x 10  Free motion SA rows 20#, 3x10 (focus on eccentric control)   Free motion SA extensions, 13-17#, 3x10  Free motion triceps extension 20# 3 x 10  Foam roll Abduction/flexion 10" x 10     Chaitanya received the following manual therapy techniques: " "Joint mobilizations, Manual traction, Myofacial release, Soft tissue Mobilization, and Friction Massage were applied to the: cervical neck for 8 minutes, including:  Cervical traction with suboccipital release  STM to left cervical paraspinals, upper trap and levator scap  TPR w/ movement to L UT, LS, and Scalenes  Palpation Assessment to determine the necessity for Functional Dry Needling of Bilateral upper trapezius with electrical stimulation.   See EMR under MEDIA for written consent provided 4/4/2023.     Chaitanya participated in neuromuscular re-education activities to improve: Balance, Coordination, Kinesthetic, Sense, Proprioception, and Posture for 20 minutes. The following activities were included:  Plank lateral walk on 3 in step 2 x 10  Shoulder taps (high plank) 3 x 15  Plank up and downs 2 x 10  Plank cross over taps 2 x 10  (B) shoulder ER against GTB, 3x10  (B) shoulder W's against RTB, 3x10  Standing Shoulder I's, Y's, T's against GTB, [id number]  Scapular wall push-ups 3 x 10   Wall clock RTB 2 x 5   Prone I's, Y's, T's, W's, 5" holds, [id number]  Spidermans w/ lift off, RTB, 1x10    Chaitanya participated in dynamic functional therapeutic activities to improve functional performance for 0  minutes, including:    Home Exercises Provided and Patient Education Provided     Education provided:   - rationale for treatment    Written Home Exercises Provided: Patient instructed to cont prior HEP.  Exercises were reviewed and Chaitanya was able to demonstrate them prior to the end of the session.  Chaitanya demonstrated good  understanding of the education provided.     See EMR under Patient Instructions for exercises provided prior visit.    Assessment   Pt tolerated therapy session good today with no adverse effects. He presents with less pain and reports more discomfort than pain. Focused session on postural and delta-scapular stabilization and strength. Continue to progress as able. Will inquire response to treatment next " ellie.     Chaitanya Is progressing well towards his goals.   Pt prognosis is Good.     Pt will continue to benefit from skilled outpatient physical therapy to address the deficits listed in the problem list box on initial evaluation, provide pt/family education and to maximize pt's level of independence in the home and community environment.     Pt's spiritual, cultural and educational needs considered and pt agreeable to plan of care and goals.     Anticipated barriers to physical therapy:     Goals:   Short Term Goals: 5 weeks   Patient will demonstrate independence with HEP in order to progress toward functional independence- MET  Pt will demonstrate improved pain by reports of less than or equal to 7/10 worst pain on the verbal rating scale in order to progress toward maximal functional ability and improve QOL.- MET  pt able to sit in upright seated position with less than 5/10 pain for improved QOL- MET  pt will be able to lift greater than or equal to 10 pounds form floor to waist with proper body mechanics for increased ease with lifting activities. - MET     Long Term Goals: 10 weeks   Patient will demonstrate improved function as indicated by a functional limitation score of 31 % on the FOTO.- progressing, not met  Pt will demonstrate improved pain by reports of less than or equal to 5/10 worst pain on the verbal rating scale in order to progress toward maximal functional ability and improve QOL.- MET  Pt will be able to correct slouch posture independently in static sitting for improved QOL- MET  Pt will improve bilateral upper extremity strength to 5/5/ with no pain for increased strength needed to perform lifting tasks- MET  pt will exhibit no pain with cervical range of motion in all planes for improved QOL and posture- progressing, not met  pt will exhibit no pain with bilateral shoulder range of motion in all planes for improved QOL and posture - MET  Pt will be able to lift greater than or equal to 20  pounds from floor to waist for increased ease with lifting tasks - progressing, not met  pt will report he has returned to the gym with less than 2/10 pain for return to prior level of function. - progressing, not met       Plan     Plan of care Certification: 4/4/2023 to 7/4/2023.     Outpatient Physical Therapy 2 times weekly for 10 weeks to include the following interventions: Aquatic Therapy, Cervical/Lumbar Traction, Electrical Stimulation , Gait Training, Manual Therapy, Moist Heat/ Ice, Neuromuscular Re-ed, Orthotic Management and Training, Patient Education, Self Care, Therapeutic Activities, Therapeutic Exercise, and Ultrasound. And any other therapies and modalities as clinically indicated and appropriate, including but not limited to FDN and telehealth. Pt may be seen by PTA as a part of pt's care team.     Ailyn Castellano, PT, DPT  6/6/2023

## 2023-06-07 ENCOUNTER — TELEPHONE (OUTPATIENT)
Dept: NEUROSURGERY | Facility: CLINIC | Age: 18
End: 2023-06-07
Payer: MEDICAID

## 2023-06-12 ENCOUNTER — PATIENT MESSAGE (OUTPATIENT)
Dept: NEUROSURGERY | Facility: CLINIC | Age: 18
End: 2023-06-12

## 2023-06-12 NOTE — PROGRESS NOTES
"Physical Therapy Daily Treatment Note     Name: Chaitanya Ace  Clinic Number: [id number]    Therapy Diagnosis:   Encounter Diagnoses   Name Primary?    Muscle hypertonicity Yes    Shoulder joint hypermobility     Upper extremity weakness        Physician: Con Marques MD    Visit Date: 6/13/2023    Physician Orders: PT Eval and Treat   Medical Diagnosis from Referral:               M54.2 (ICD-10-CM) - Neck pain M54.12 (ICD-10-CM) - Cervical radiculopathy   Evaluation Date: 4/4/2023  Authorization Period Expiration: 4/2/2024  Plan of Care Expiration: 7/4/2023  Visit # / Visits authorized: 11/16     PN DUE: 6/17/2023  PTA Visit: 0/5    Time In: 3:30 PM  Time Out: 4:15 PM  Total Billable Time: 45 minutes    Precautions: Standard    Subjective   Pt reports: feeling better overall. Increased discomfort noted on right side greater than left today.     He was compliant with home exercise program.  Response to previous treatment: no pain, no soreness  Functional change: decreased pain     Pain:  Current 6/10, worst 10/10, best 3/10   Location:  bilateral upper trap pain (L>R) with radicular symptoms  Description: Burning; "shard of glass from head to upper trap that I can feel in my left sinus"  Aggravating Factors: erect sitting, overhead lifting  Easing Factors: hot shower, stretches    Objective     Objective Measures updated at progress report unless specified.     Treatment     Billing all therapy as therapeutic exercise per [place] Medicaid mary Tavares received therapeutic exercises to develop strength, endurance, ROM, flexibility, posture, and core stabilization for 17 minutes including:  UBE bike for increased UE ROM, strength and endurance, 3/3'  Chair push-ups 3 x 10  Free motion SA rows 20#, 3x10 (focus on eccentric control)   Free motion SA extensions, 13-17#, 3x10  Free motion triceps extension 20# 3 x 10  Foam roll Abduction/flexion 10" x 10     Chaitanya received the following manual therapy techniques: " "Joint mobilizations, Manual traction, Myofacial release, Soft tissue Mobilization, and Friction Massage were applied to the: cervical neck for 8 minutes, including:  Cervical traction with suboccipital release  STM to left cervical paraspinals, upper trap and levator scap  TPR w/ movement to L UT, LS, and Scalenes  Palpation Assessment to determine the necessity for Functional Dry Needling of Bilateral upper trapezius with electrical stimulation.   See EMR under MEDIA for written consent provided 4/4/2023.     Chaitanya participated in neuromuscular re-education activities to improve: Balance, Coordination, Kinesthetic, Sense, Proprioception, and Posture for 20 minutes. The following activities were included:  Plank lateral walk on 3 in step 2 x 10  Shoulder taps (high plank) 3 x 15  Plank up and downs 2 x 10  Plank cross over taps 2 x 10  (B) shoulder ER against GTB, 3x10  (B) shoulder W's against RTB, 3x10  Standing Shoulder I's, Y's, T's against GTB, [id number]  Prone I's, Y's, T's, W's, 5" holds, [id number]  Spidermans w/ lift off, RTB, 1x10    Chaitanya participated in dynamic functional therapeutic activities to improve functional performance for 0  minutes, including:    Home Exercises Provided and Patient Education Provided     Education provided:   - rationale for treatment    Written Home Exercises Provided: Patient instructed to cont prior HEP.  Exercises were reviewed and Chaitanya was able to demonstrate them prior to the end of the session.  Chaitanya demonstrated good  understanding of the education provided.     See EMR under Patient Instructions for exercises provided prior visit.    Assessment   Pt tolerated therapy session good today with no adverse effects.Continued to focus session on postural and delta-scapular stabilization and strength. Much fatigue noted during therapy session today. Continue to progress as able. Will inquire response to treatment next session.     Chaitanya Is progressing well towards his goals.   Pt " prognosis is Good.     Pt will continue to benefit from skilled outpatient physical therapy to address the deficits listed in the problem list box on initial evaluation, provide pt/family education and to maximize pt's level of independence in the home and community environment.     Pt's spiritual, cultural and educational needs considered and pt agreeable to plan of care and goals.     Anticipated barriers to physical therapy:     Goals:   Short Term Goals: 5 weeks   Patient will demonstrate independence with HEP in order to progress toward functional independence- MET  Pt will demonstrate improved pain by reports of less than or equal to 7/10 worst pain on the verbal rating scale in order to progress toward maximal functional ability and improve QOL.- MET  pt able to sit in upright seated position with less than 5/10 pain for improved QOL- MET  pt will be able to lift greater than or equal to 10 pounds form floor to waist with proper body mechanics for increased ease with lifting activities. - MET     Long Term Goals: 10 weeks   Patient will demonstrate improved function as indicated by a functional limitation score of 31 % on the FOTO.- progressing, not met  Pt will demonstrate improved pain by reports of less than or equal to 5/10 worst pain on the verbal rating scale in order to progress toward maximal functional ability and improve QOL.- MET  Pt will be able to correct slouch posture independently in static sitting for improved QOL- MET  Pt will improve bilateral upper extremity strength to 5/5/ with no pain for increased strength needed to perform lifting tasks- MET  pt will exhibit no pain with cervical range of motion in all planes for improved QOL and posture- progressing, not met  pt will exhibit no pain with bilateral shoulder range of motion in all planes for improved QOL and posture - MET  Pt will be able to lift greater than or equal to 20 pounds from floor to waist for increased ease with lifting  tasks - progressing, not met  pt will report he has returned to the gym with less than 2/10 pain for return to prior level of function. - progressing, not met       Plan     Plan of care Certification: 4/4/2023 to 7/4/2023.     Outpatient Physical Therapy 2 times weekly for 10 weeks to include the following interventions: Aquatic Therapy, Cervical/Lumbar Traction, Electrical Stimulation , Gait Training, Manual Therapy, Moist Heat/ Ice, Neuromuscular Re-ed, Orthotic Management and Training, Patient Education, Self Care, Therapeutic Activities, Therapeutic Exercise, and Ultrasound. And any other therapies and modalities as clinically indicated and appropriate, including but not limited to FDN and telehealth. Pt may be seen by PTA as a part of pt's care team.     Ailyn Castellano, PT, DPT  6/13/2023

## 2023-06-13 ENCOUNTER — CLINICAL SUPPORT (OUTPATIENT)
Dept: REHABILITATION | Facility: HOSPITAL | Age: 18
End: 2023-06-13
Payer: MEDICAID

## 2023-06-13 DIAGNOSIS — M62.89 MUSCLE HYPERTONICITY: Primary | ICD-10-CM

## 2023-06-13 DIAGNOSIS — R29.898 UPPER EXTREMITY WEAKNESS: ICD-10-CM

## 2023-06-13 DIAGNOSIS — M35.7 SHOULDER JOINT HYPERMOBILITY: ICD-10-CM

## 2023-06-13 PROCEDURE — 97110 THERAPEUTIC EXERCISES: CPT | Mod: PN

## 2023-06-15 ENCOUNTER — CLINICAL SUPPORT (OUTPATIENT)
Dept: REHABILITATION | Facility: HOSPITAL | Age: 18
End: 2023-06-15
Payer: MEDICAID

## 2023-06-15 DIAGNOSIS — M62.89 MUSCLE HYPERTONICITY: Primary | ICD-10-CM

## 2023-06-15 DIAGNOSIS — R29.898 UPPER EXTREMITY WEAKNESS: ICD-10-CM

## 2023-06-15 DIAGNOSIS — M35.7 SHOULDER JOINT HYPERMOBILITY: ICD-10-CM

## 2023-06-15 PROCEDURE — 97110 THERAPEUTIC EXERCISES: CPT | Mod: PN

## 2023-06-15 NOTE — PROGRESS NOTES
"Physical Therapy Daily Treatment Note     Name: Chaitanya Ace  Clinic Number: [id number]    Therapy Diagnosis:   Encounter Diagnoses   Name Primary?    Muscle hypertonicity Yes    Shoulder joint hypermobility     Upper extremity weakness      Physician: Con Marques MD    Visit Date: 6/15/2023    Physician Orders: PT Eval and Treat   Medical Diagnosis from Referral:               M54.2 (ICD-10-CM) - Neck pain M54.12 (ICD-10-CM) - Cervical radiculopathy   Evaluation Date: 4/4/2023  Authorization Period Expiration: 4/2/2024  Plan of Care Expiration: 7/4/2023  Visit # / Visits authorized: 14/16     PN DUE: 6/17/2023  PTA Visit: 0/5    Time In: 3:30 PM  Time Out: 4:15 PM  Total Billable Time: 45 minutes    Precautions: Standard    Subjective   Pt reports: feeling better overall, but reports continued discomfort of upper traps.     He was compliant with home exercise program.  Response to previous treatment: increased soreness lasting less than 24 hours   Functional change: decreased pain     Pain:  Current 6/10, worst 10/10, best 3/10   Location:  bilateral upper trap pain (L>R) with radicular symptoms  Description: Burning; "shard of glass from head to upper trap that I can feel in my left sinus"  Aggravating Factors: erect sitting, overhead lifting  Easing Factors: hot shower, stretches    Objective     Objective Measures updated at progress report unless specified.     Function: FOTO  35% LIMITATION    Treatment     Billing all therapy as therapeutic exercise per [place] Medicaid mary    Chaitanya received therapeutic exercises to develop strength, endurance, ROM, flexibility, posture, and core stabilization for 17 minutes including:  UBE bike for increased UE ROM, strength and endurance, 3/3'  Chair push-ups 3 x 10  Free motion SA rows 20#, 3x10 (focus on eccentric control)   Free motion SA extensions, 13-17#, 3x10  Free motion triceps extension 20# 3 x 10  Foam roll Abduction/flexion 10" x 10     Chaitanya received " "the following manual therapy techniques: Joint mobilizations, Manual traction, Myofacial release, Soft tissue Mobilization, and Friction Massage were applied to the: cervical neck for 8 minutes, including:  Cervical traction with suboccipital release  STM to left cervical paraspinals, upper trap and levator scap  TPR w/ movement to L UT, LS, and Scalenes  Palpation Assessment to determine the necessity for Functional Dry Needling of Bilateral upper trapezius with electrical stimulation.   See EMR under MEDIA for written consent provided 4/4/2023.     Chaitanya participated in neuromuscular re-education activities to improve: Balance, Coordination, Kinesthetic, Sense, Proprioception, and Posture for 20 minutes. The following activities were included:  Plank lateral walk on 3 in step 2 x 10  Shoulder taps (high plank) 3 x 15  Plank up and downs 2 x 10  Plank cross over taps 2 x 10  (B) shoulder ER against GTB, 3x10  (B) shoulder W's against RTB, 3x10  Standing Shoulder I's, Y's, T's against GTB, [id number]  Prone I's, Y's, T's, W's, 5" holds, [id number]  Spidermans w/ lift off, RTB, 1x10    Chaitanya participated in dynamic functional therapeutic activities to improve functional performance for 0  minutes, including:    Home Exercises Provided and Patient Education Provided     Education provided:   - rationale for treatment    Written Home Exercises Provided: Patient instructed to cont prior HEP.  Exercises were reviewed and Chaitanya was able to demonstrate them prior to the end of the session.  Chaitanya demonstrated good  understanding of the education provided.     See EMR under Patient Instructions for exercises provided prior visit.    Assessment   Pt tolerated therapy session good today with no adverse effects. Focused session on postural and delta-scapular stabilization and strength. Requires cueing on proper form for plank exercises. Much fatigue noted during therapy session today. Continue to progress as able. Will inquire response " to treatment next session.     Chaitanya Is progressing well towards his goals.   Pt prognosis is Good.     Pt will continue to benefit from skilled outpatient physical therapy to address the deficits listed in the problem list box on initial evaluation, provide pt/family education and to maximize pt's level of independence in the home and community environment.     Pt's spiritual, cultural and educational needs considered and pt agreeable to plan of care and goals.     Anticipated barriers to physical therapy:     Goals:   Short Term Goals: 5 weeks   Patient will demonstrate independence with HEP in order to progress toward functional independence- MET  Pt will demonstrate improved pain by reports of less than or equal to 7/10 worst pain on the verbal rating scale in order to progress toward maximal functional ability and improve QOL.- MET  pt able to sit in upright seated position with less than 5/10 pain for improved QOL- MET  pt will be able to lift greater than or equal to 10 pounds form floor to waist with proper body mechanics for increased ease with lifting activities. - MET     Long Term Goals: 10 weeks   Patient will demonstrate improved function as indicated by a functional limitation score of 31 % on the FOTO.- progressing, not met  Pt will demonstrate improved pain by reports of less than or equal to 5/10 worst pain on the verbal rating scale in order to progress toward maximal functional ability and improve QOL.- MET  Pt will be able to correct slouch posture independently in static sitting for improved QOL- MET  Pt will improve bilateral upper extremity strength to 5/5/ with no pain for increased strength needed to perform lifting tasks- MET  pt will exhibit no pain with cervical range of motion in all planes for improved QOL and posture- progressing, not met  pt will exhibit no pain with bilateral shoulder range of motion in all planes for improved QOL and posture - MET  Pt will be able to lift greater  than or equal to 20 pounds from floor to waist for increased ease with lifting tasks - progressing, not met  pt will report he has returned to the gym with less than 2/10 pain for return to prior level of function. - progressing, not met       Plan     Plan of care Certification: 4/4/2023 to 7/4/2023.     Outpatient Physical Therapy 2 times weekly for 10 weeks to include the following interventions: Aquatic Therapy, Cervical/Lumbar Traction, Electrical Stimulation , Gait Training, Manual Therapy, Moist Heat/ Ice, Neuromuscular Re-ed, Orthotic Management and Training, Patient Education, Self Care, Therapeutic Activities, Therapeutic Exercise, and Ultrasound. And any other therapies and modalities as clinically indicated and appropriate, including but not limited to FDN and telehealth. Pt may be seen by PTA as a part of pt's care team.     Ailyn Castellano, PT, DPT  6/15/2023

## 2023-06-20 ENCOUNTER — CLINICAL SUPPORT (OUTPATIENT)
Dept: REHABILITATION | Facility: HOSPITAL | Age: 18
End: 2023-06-20
Payer: MEDICAID

## 2023-06-20 DIAGNOSIS — M62.89 MUSCLE HYPERTONICITY: Primary | ICD-10-CM

## 2023-06-20 DIAGNOSIS — R29.898 UPPER EXTREMITY WEAKNESS: ICD-10-CM

## 2023-06-20 DIAGNOSIS — M35.7 SHOULDER JOINT HYPERMOBILITY: ICD-10-CM

## 2023-06-20 PROCEDURE — 97110 THERAPEUTIC EXERCISES: CPT | Mod: PN,CQ

## 2023-06-20 NOTE — PROGRESS NOTES
"Physical Therapy Daily Treatment Note     Name: Chaitanya Ace  Clinic Number: [id number]    Therapy Diagnosis:   Encounter Diagnoses   Name Primary?    Muscle hypertonicity Yes    Shoulder joint hypermobility     Upper extremity weakness      Physician: Con Marques MD    Visit Date: 6/20/2023    Physician Orders: PT Eval and Treat   Medical Diagnosis from Referral:               M54.2 (ICD-10-CM) - Neck pain M54.12 (ICD-10-CM) - Cervical radiculopathy   Evaluation Date: 4/4/2023  Authorization Period Expiration: 4/2/2024  Plan of Care Expiration: 7/4/2023  Visit # / Visits authorized: 14/16 (+eval)     PN DUE: 6/17/2023  PTA Visit: 1/5    Time In: 12:30 PM  Time Out: 1:15 PM  Total Billable Time: 45 minutes    Precautions: Standard    Subjective   Pt reports: feeling better and better.     He was compliant with home exercise program.  Response to previous treatment: increased soreness lasting less than 24 hours   Functional change: decreased pain     Pain:  Current 6/10, worst 10/10, best 3/10   Location:  bilateral upper trap pain (L>R) with radicular symptoms  Description: Burning; "shard of glass from head to upper trap that I can feel in my left sinus"  Aggravating Factors: erect sitting, overhead lifting  Easing Factors: hot shower, stretches    Objective     Objective Measures updated at progress report unless specified.     Function: FOTO  35% LIMITATION    Treatment     Billing all therapy as therapeutic exercise per [place] Medicaid guidlines    Chaitanya received therapeutic exercises to develop strength, endurance, ROM, flexibility, posture, and core stabilization for 15 minutes including:  UBE bike for increased UE ROM, strength and endurance, 3/3'  Chair push-ups 3 x 10  Free motion SA rows 20#, 3x10 (focus on eccentric control)   Free motion SA extensions, 17#, 3x10  Free motion triceps extension 20# 3 x 10  Foam roll Abduction/flexion 10" x 10     Chaitanya received the following manual therapy techniques: " "Joint mobilizations, Manual traction, Myofacial release, Soft tissue Mobilization, and Friction Massage were applied to the: cervical neck for 10 minutes, including:  Cervical traction with suboccipital release  STM to left cervical paraspinals, upper trap and levator scap  TPR w/ movement to L UT, LS, and Scalenes  Cupping to B UT, B LS  Palpation Assessment to determine the necessity for Functional Dry Needling of Bilateral upper trapezius with electrical stimulation.   See EMR under MEDIA for written consent provided 4/4/2023.     Chaitanya participated in neuromuscular re-education activities to improve: Balance, Coordination, Kinesthetic, Sense, Proprioception, and Posture for 20 minutes. The following activities were included:  Plank lateral walk on 3 in step 2 x 10  Shoulder taps (high plank) 3 x 15  Plank up and downs 2 x 10  Plank cross over taps 2 x 10  Windmill planks, 4# db, 2x10  (B) shoulder ER against GTB, 3x10  (B) shoulder W's against RTB, 3x10  Standing Shoulder I's, Y's, T's against GTB, [id number]  Prone I's, Y's, T's, W's, 5" holds, [id number]  Spidermans w/ lift off, RTB, 1x10    Chaitanya participated in dynamic functional therapeutic activities to improve functional performance for 0  minutes, including:    Home Exercises Provided and Patient Education Provided     Education provided:   - rationale for treatment    Written Home Exercises Provided: Patient instructed to cont prior HEP.  Exercises were reviewed and Chaitanya was able to demonstrate them prior to the end of the session.  Chaitanya demonstrated good  understanding of the education provided.     See EMR under Patient Instructions for exercises provided prior visit.    Assessment   Pt tolerated therapy session good today with no adverse effects. Continued focus on postural and delta-scapular stabilization and strength. Requires cueing on proper form for plank exercises. Added windmill planks added for core and upper thoracic strengthening.  Performed " cupping and will inquire response next visit. Continue to progress as able. Will inquire response to treatment next session.     Chaitanya Is progressing well towards his goals.   Pt prognosis is Good.     Pt will continue to benefit from skilled outpatient physical therapy to address the deficits listed in the problem list box on initial evaluation, provide pt/family education and to maximize pt's level of independence in the home and community environment.     Pt's spiritual, cultural and educational needs considered and pt agreeable to plan of care and goals.     Anticipated barriers to physical therapy:     Goals:   Short Term Goals: 5 weeks   Patient will demonstrate independence with HEP in order to progress toward functional independence- MET  Pt will demonstrate improved pain by reports of less than or equal to 7/10 worst pain on the verbal rating scale in order to progress toward maximal functional ability and improve QOL.- MET  pt able to sit in upright seated position with less than 5/10 pain for improved QOL- MET  pt will be able to lift greater than or equal to 10 pounds form floor to waist with proper body mechanics for increased ease with lifting activities. - MET     Long Term Goals: 10 weeks   Patient will demonstrate improved function as indicated by a functional limitation score of 31 % on the FOTO.- progressing, not met  Pt will demonstrate improved pain by reports of less than or equal to 5/10 worst pain on the verbal rating scale in order to progress toward maximal functional ability and improve QOL.- MET  Pt will be able to correct slouch posture independently in static sitting for improved QOL- MET  Pt will improve bilateral upper extremity strength to 5/5/ with no pain for increased strength needed to perform lifting tasks- MET  pt will exhibit no pain with cervical range of motion in all planes for improved QOL and posture- progressing, not met  pt will exhibit no pain with bilateral shoulder  range of motion in all planes for improved QOL and posture - MET  Pt will be able to lift greater than or equal to 20 pounds from floor to waist for increased ease with lifting tasks - progressing, not met  pt will report he has returned to the gym with less than 2/10 pain for return to prior level of function. - progressing, not met       Plan     Plan of care Certification: 4/4/2023 to 7/4/2023.     Outpatient Physical Therapy 2 times weekly for 10 weeks to include the following interventions: Aquatic Therapy, Cervical/Lumbar Traction, Electrical Stimulation , Gait Training, Manual Therapy, Moist Heat/ Ice, Neuromuscular Re-ed, Orthotic Management and Training, Patient Education, Self Care, Therapeutic Activities, Therapeutic Exercise, and Ultrasound. And any other therapies and modalities as clinically indicated and appropriate, including but not limited to FDN and telehealth. Pt may be seen by PTA as a part of pt's care team.     Jay Jay Barnes, CAITIE  6/20/2023

## 2023-06-23 ENCOUNTER — CLINICAL SUPPORT (OUTPATIENT)
Dept: REHABILITATION | Facility: HOSPITAL | Age: 18
End: 2023-06-23
Payer: MEDICAID

## 2023-06-23 DIAGNOSIS — M35.7 SHOULDER JOINT HYPERMOBILITY: ICD-10-CM

## 2023-06-23 DIAGNOSIS — M62.89 MUSCLE HYPERTONICITY: Primary | ICD-10-CM

## 2023-06-23 DIAGNOSIS — R29.898 UPPER EXTREMITY WEAKNESS: ICD-10-CM

## 2023-06-23 PROCEDURE — 97110 THERAPEUTIC EXERCISES: CPT | Mod: PN,CQ

## 2023-06-23 NOTE — PROGRESS NOTES
"Physical Therapy Daily Treatment Note     Name: Chaitanya Ace  Clinic Number: [id number]    Therapy Diagnosis:   Encounter Diagnoses   Name Primary?    Muscle hypertonicity Yes    Shoulder joint hypermobility     Upper extremity weakness      Physician: Con Marques MD    Visit Date: 6/23/2023    Physician Orders: PT Eval and Treat   Medical Diagnosis from Referral:               M54.2 (ICD-10-CM) - Neck pain M54.12 (ICD-10-CM) - Cervical radiculopathy   Evaluation Date: 4/4/2023  Authorization Period Expiration: 4/2/2024  Plan of Care Expiration: 7/4/2023  Visit # / Visits authorized: 15/16 (+eval)     PN DUE: 6/17/2023  PTA Visit: 2/5    Time In: 11:15 AM  Time Out: 12:00 PM  Total Billable Time: 45 minutes    Precautions: Standard    Subjective   Pt reports: feeling better.  Rates pain only 2/10.  Felt sore from the cupping but felt better after soreness diminished.    He was compliant with home exercise program.  Response to previous treatment: increased soreness lasting less than 24 hours   Functional change: decreased pain     Pain:  Current 2/10, worst 10/10, best 2/10   Location:  bilateral upper trap pain (L>R) with radicular symptoms  Description: Burning; "shard of glass from head to upper trap that I can feel in my left sinus"  Aggravating Factors: erect sitting, overhead lifting  Easing Factors: hot shower, stretches    Objective     Objective Measures updated at progress report unless specified.     Function: FOTO  35% LIMITATION    Treatment     Billing all therapy as therapeutic exercise per [place] Medicaid mary Tavares received therapeutic exercises to develop strength, endurance, ROM, flexibility, posture, and core stabilization for 15 minutes including:  UBE bike for increased UE ROM, strength and endurance, 3/3'  Chair push-ups 3 x 10  Free motion SA rows 20#, 3x10 (focus on eccentric control)   Free motion SA extensions, 17#, 3x10  Free motion triceps extension 20# 3 x 10  Free motion " "overhead triceps extension, 7#, 3x10  Row w/ shoulder press w/ tband bias, GTB, 2# db, 2x10  Foam roll Abduction/flexion 10" x 10     Chaitanya received the following manual therapy techniques: Joint mobilizations, Manual traction, Myofacial release, Soft tissue Mobilization, and Friction Massage were applied to the: cervical neck for 10 minutes, including:  Cervical traction with suboccipital release  STM to left cervical paraspinals, upper trap and levator scap  TPR w/ movement to L UT, LS, and Scalenes  Cupping to B UT, B LS  Palpation Assessment to determine the necessity for Functional Dry Needling of Bilateral upper trapezius with electrical stimulation.   See EMR under MEDIA for written consent provided 4/4/2023.     Chaitanya participated in neuromuscular re-education activities to improve: Balance, Coordination, Kinesthetic, Sense, Proprioception, and Posture for 20 minutes. The following activities were included:  Plank lateral walk on 3 in step 2 x 10  Shoulder taps (high plank) 3 x 15  Plank up and downs 2 x 10  Plank cross over taps 2 x 10  Windmill planks, 4# db, 2x10  (B) shoulder ER against GTB, 3x10  (B) shoulder W's against RTB, 3x10  Standing Shoulder I's, Y's, T's against GTB, [id number]  Prone I's, Y's, T's, W's, 5" holds, [id number]  Spidermans w/ lift off, RTB, 1x10    Chaitanya participated in dynamic functional therapeutic activities to improve functional performance for 0  minutes, including:    Home Exercises Provided and Patient Education Provided     Education provided:   - rationale for treatment    Written Home Exercises Provided: Patient instructed to cont prior HEP.  Exercises were reviewed and Chaitanya was able to demonstrate them prior to the end of the session.  Chaitanya demonstrated good  understanding of the education provided.     See EMR under Patient Instructions for exercises provided prior visit.    Assessment   Pt tolerated therapy session good today with no adverse effects. Continued focus on " postural and delta-scapular stabilization and strength. Strength has improved overall as well as his pain levels.  Required cuing for most exercises as he was having difficulty with technique.  Continue to progress as able. Will inquire response to treatment next session.     Chaitanya Is progressing well towards his goals.   Pt prognosis is Good.     Pt will continue to benefit from skilled outpatient physical therapy to address the deficits listed in the problem list box on initial evaluation, provide pt/family education and to maximize pt's level of independence in the home and community environment.     Pt's spiritual, cultural and educational needs considered and pt agreeable to plan of care and goals.     Anticipated barriers to physical therapy:     Goals:   Short Term Goals: 5 weeks   Patient will demonstrate independence with HEP in order to progress toward functional independence- MET  Pt will demonstrate improved pain by reports of less than or equal to 7/10 worst pain on the verbal rating scale in order to progress toward maximal functional ability and improve QOL.- MET  pt able to sit in upright seated position with less than 5/10 pain for improved QOL- MET  pt will be able to lift greater than or equal to 10 pounds form floor to waist with proper body mechanics for increased ease with lifting activities. - MET     Long Term Goals: 10 weeks   Patient will demonstrate improved function as indicated by a functional limitation score of 31 % on the FOTO.- progressing, not met  Pt will demonstrate improved pain by reports of less than or equal to 5/10 worst pain on the verbal rating scale in order to progress toward maximal functional ability and improve QOL.- MET  Pt will be able to correct slouch posture independently in static sitting for improved QOL- MET  Pt will improve bilateral upper extremity strength to 5/5/ with no pain for increased strength needed to perform lifting tasks- MET  pt will exhibit no pain  with cervical range of motion in all planes for improved QOL and posture- progressing, not met  pt will exhibit no pain with bilateral shoulder range of motion in all planes for improved QOL and posture - MET  Pt will be able to lift greater than or equal to 20 pounds from floor to waist for increased ease with lifting tasks - progressing, not met  pt will report he has returned to the gym with less than 2/10 pain for return to prior level of function. - progressing, not met       Plan     Plan of care Certification: 4/4/2023 to 7/4/2023.     Outpatient Physical Therapy 2 times weekly for 10 weeks to include the following interventions: Aquatic Therapy, Cervical/Lumbar Traction, Electrical Stimulation , Gait Training, Manual Therapy, Moist Heat/ Ice, Neuromuscular Re-ed, Orthotic Management and Training, Patient Education, Self Care, Therapeutic Activities, Therapeutic Exercise, and Ultrasound. And any other therapies and modalities as clinically indicated and appropriate, including but not limited to FDN and telehealth. Pt may be seen by PTA as a part of pt's care team.     Jay Jay Barnes, CAITIE  6/23/2023

## 2023-06-27 ENCOUNTER — CLINICAL SUPPORT (OUTPATIENT)
Dept: REHABILITATION | Facility: HOSPITAL | Age: 18
End: 2023-06-27
Payer: MEDICAID

## 2023-06-27 DIAGNOSIS — M35.7 SHOULDER JOINT HYPERMOBILITY: ICD-10-CM

## 2023-06-27 DIAGNOSIS — R29.898 UPPER EXTREMITY WEAKNESS: ICD-10-CM

## 2023-06-27 DIAGNOSIS — M62.89 MUSCLE HYPERTONICITY: Primary | ICD-10-CM

## 2023-06-27 PROCEDURE — 97110 THERAPEUTIC EXERCISES: CPT | Mod: PN

## 2023-06-27 NOTE — PROGRESS NOTES
"Physical Therapy Daily Treatment Note     Name: Chaitanya Ace  Clinic Number: [id number]    Therapy Diagnosis:   Encounter Diagnoses   Name Primary?    Muscle hypertonicity Yes    Shoulder joint hypermobility     Upper extremity weakness      Physician: Con Marques MD    Visit Date: 6/27/2023    Physician Orders: PT Eval and Treat   Medical Diagnosis from Referral:               M54.2 (ICD-10-CM) - Neck pain M54.12 (ICD-10-CM) - Cervical radiculopathy   Evaluation Date: 4/4/2023  Authorization Period Expiration: 4/2/2024  Plan of Care Expiration: 7/4/2023  Visit # / Visits authorized: 16/16 (+eval)     PN DUE: 6/17/2023  PTA Visit: 0/5    Time In: 12:30 AM  Time Out: 1:15 PM  Total Billable Time: 45 minutes    Precautions: Standard    Subjective   Pt reports: feeling better.      He was compliant with home exercise program.  Response to previous treatment: increased soreness lasting less than 24 hours   Functional change: decreased pain     Pain:  Current 2/10, worst 10/10, best 2/10   Location:  bilateral upper trap pain (L>R) with radicular symptoms  Description: Burning; "shard of glass from head to upper trap that I can feel in my left sinus"  Aggravating Factors: erect sitting, overhead lifting  Easing Factors: hot shower, stretches    Objective     Objective Measures updated at progress report unless specified.     Treatment     Billing all therapy as therapeutic exercise per [place] Medicaid guidjudi Tavares received therapeutic exercises to develop strength, endurance, ROM, flexibility, posture, and core stabilization for 25 minutes including:  UBE bike for increased UE ROM, strength and endurance, 3/3'  Chair push-ups 3 x 10  Free motion SA rows 20#, 3x10 (focus on eccentric control)   Free motion SA extensions, 17#, 3x10  Free motion triceps extension 20# 3 x 10  Free motion overhead triceps extension, 7#, 3x10  Row w/ shoulder press w/ tband bias, GTB, 2# db, 2x10  Foam roll Abduction/flexion 10" x " "10     Chaitanya received the following manual therapy techniques: Joint mobilizations, Manual traction, Myofacial release, Soft tissue Mobilization, and Friction Massage were applied to the: cervical neck for 0 minutes, including:  Cervical traction with suboccipital release  STM to left cervical paraspinals, upper trap and levator scap  TPR w/ movement to L UT, LS, and Scalenes  Cupping to B UT, B LS  Palpation Assessment to determine the necessity for Functional Dry Needling of Bilateral upper trapezius with electrical stimulation.   See EMR under MEDIA for written consent provided 4/4/2023.     Chaitanya participated in neuromuscular re-education activities to improve: Balance, Coordination, Kinesthetic, Sense, Proprioception, and Posture for 20 minutes. The following activities were included:  Plank lateral walk on 3 in step 2 x 10  Shoulder taps (high plank) 3 x 15  Plank up and downs 2 x 10  Plank cross over taps 2 x 10  Windmill planks, 4# db, 2x10  (B) shoulder ER against GTB, 3x10  (B) shoulder W's against RTB, 3x10  Standing Shoulder I's, Y's, T's against GTB, [id number]  Prone I's, Y's, T's, W's, 5" holds, [id number]  Spidermans w/ lift off, RTB, 1x10    Chaitanya participated in dynamic functional therapeutic activities to improve functional performance for 0  minutes, including:    Home Exercises Provided and Patient Education Provided     Education provided:   - rationale for treatment    Written Home Exercises Provided: Patient instructed to cont prior HEP.  Exercises were reviewed and Chaitanya was able to demonstrate them prior to the end of the session.  Chaitanya demonstrated good  understanding of the education provided.     See EMR under Patient Instructions for exercises provided prior visit.    Assessment   Pt tolerated therapy session good today with no adverse effects. Continued focus on postural and delta-scapular stabilization and strength. Requires cueing on new exercises for control of motion and proper muscular " activation. Plan to discharge at next session.  Continue to progress as tolerated.    Chaitanya Is progressing well towards his goals.   Pt prognosis is Good.     Pt will continue to benefit from skilled outpatient physical therapy to address the deficits listed in the problem list box on initial evaluation, provide pt/family education and to maximize pt's level of independence in the home and community environment.     Pt's spiritual, cultural and educational needs considered and pt agreeable to plan of care and goals.     Anticipated barriers to physical therapy:     Goals:   Short Term Goals: 5 weeks   Patient will demonstrate independence with HEP in order to progress toward functional independence- MET  Pt will demonstrate improved pain by reports of less than or equal to 7/10 worst pain on the verbal rating scale in order to progress toward maximal functional ability and improve QOL.- MET  pt able to sit in upright seated position with less than 5/10 pain for improved QOL- MET  pt will be able to lift greater than or equal to 10 pounds form floor to waist with proper body mechanics for increased ease with lifting activities. - MET     Long Term Goals: 10 weeks   Patient will demonstrate improved function as indicated by a functional limitation score of 31 % on the FOTO.- progressing, not met  Pt will demonstrate improved pain by reports of less than or equal to 5/10 worst pain on the verbal rating scale in order to progress toward maximal functional ability and improve QOL.- MET  Pt will be able to correct slouch posture independently in static sitting for improved QOL- MET  Pt will improve bilateral upper extremity strength to 5/5/ with no pain for increased strength needed to perform lifting tasks- MET  pt will exhibit no pain with cervical range of motion in all planes for improved QOL and posture- progressing, not met  pt will exhibit no pain with bilateral shoulder range of motion in all planes for improved QOL  and posture - MET  Pt will be able to lift greater than or equal to 20 pounds from floor to waist for increased ease with lifting tasks - progressing, not met  pt will report he has returned to the gym with less than 2/10 pain for return to prior level of function. - progressing, not met       Plan     Plan of care Certification: 4/4/2023 to 7/4/2023.     Outpatient Physical Therapy 2 times weekly for 10 weeks to include the following interventions: Aquatic Therapy, Cervical/Lumbar Traction, Electrical Stimulation , Gait Training, Manual Therapy, Moist Heat/ Ice, Neuromuscular Re-ed, Orthotic Management and Training, Patient Education, Self Care, Therapeutic Activities, Therapeutic Exercise, and Ultrasound. And any other therapies and modalities as clinically indicated and appropriate, including but not limited to FDN and telehealth. Pt may be seen by PTA as a part of pt's care team.     Ailyn Castellano, PT, DPT  6/27/2023

## 2023-06-28 NOTE — PROGRESS NOTES
"Physical Therapy Daily Treatment Note     Name: Chaitanya Ace  Clinic Number: [id number]    Therapy Diagnosis:   Encounter Diagnoses   Name Primary?    Muscle hypertonicity Yes    Shoulder joint hypermobility     Upper extremity weakness      Physician: Con Marques MD    Visit Date: 6/29/2023    Physician Orders: PT Eval and Treat   Medical Diagnosis from Referral:               M54.2 (ICD-10-CM) - Neck pain M54.12 (ICD-10-CM) - Cervical radiculopathy   Evaluation Date: 4/4/2023  Authorization Period Expiration: 4/2/2024  Plan of Care Expiration: 7/4/2023  Visit # / Visits authorized: 17/16 (+eval)     PN DUE: 6/17/2023  PTA Visit: 0/5    Time In: 12:20 PM  Time Out: 1:15 PM  Total Billable Time: 55 minutes    Precautions: Standard    Subjective   Pt reports: feeling better, still has some discomfort in neck with heavy activities.     He was compliant with home exercise program.  Response to previous treatment: increased soreness lasting less than 24 hours   Functional change: decreased pain     Pain:  Current 2/10, worst 10/10, best 2/10   Location:  bilateral upper trap pain (L>R) with radicular symptoms  Description: Burning; "shard of glass from head to upper trap that I can feel in my left sinus"  Aggravating Factors: erect sitting, overhead lifting  Easing Factors: hot shower, stretches    Objective     Objective Measures updated at progress report unless specified.     Function: FOTO      Treatment     Billing all therapy as therapeutic exercise per [place] Medicaid mary Tavares received therapeutic exercises to develop strength, endurance, ROM, flexibility, posture, and core stabilization for 25 minutes including:  UBE bike for increased UE ROM, strength and endurance, 3/3'  Chair push-ups 3 x 10  Free motion SA rows 20#, 3x10 (focus on eccentric control)   Free motion SA extensions, 17#, 3x10  Free motion triceps extension 20# 3 x 10  Free motion overhead triceps extension, 7#, 3x10  Rows w/ " shoulder press w/ tband bias, GTB, 2# db, 2x10     Chaitanya received the following manual therapy techniques: Joint mobilizations, Manual traction, Myofacial release, Soft tissue Mobilization, and Friction Massage were applied to the: cervical neck for 8 minutes, including:  Palpation Assessment to determine the necessity for Functional Dry Needling of Bilateral upper trapezius with electrical stimulation.   See EMR under MEDIA for written consent provided 4/4/2023.     Chaitanya participated in neuromuscular re-education activities to improve: Balance, Coordination, Kinesthetic, Sense, Proprioception, and Posture for 22 minutes. The following activities were included:  Plank lateral walk on 3 in step 2 x 10  Shoulder taps (high plank) 2 x 10  Plank up and downs 2 x 10  Windmill planks, 4# db, 2x10  Standing Shoulder I's, Y's, T's against GTB, [id number]  Spidermans w/ lift off, RTB, 1x10    Home Exercises Provided and Patient Education Provided     Education provided:   - rationale for treatment    Written Home Exercises Provided: Patient instructed to cont prior HEP.  Exercises were reviewed and Chaitanya was able to demonstrate them prior to the end of the session.  Chaitanya demonstrated good  understanding of the education provided.     See EMR under Patient Instructions for exercises provided prior visit.    Assessment   Pt tolerated therapy session good today with no adverse effects. He continues with improved strength and stability and minor pain in his neck with heavy activities. He was educated on importance of keeping up with exercises for overall shoulder stability due to hypermobile joints. Focused session on postural and delta-scapular stabilization and strength. Pt able to complete all exercises independently. Mr. Ace was discharged today, 6/29/2023 as he has met 11 out of 12 goals and has reached maximum potential in physical therapy.     Chaitanya Is progressing well towards his goals.   Pt prognosis is Good.     Pt will  continue to benefit from skilled outpatient physical therapy to address the deficits listed in the problem list box on initial evaluation, provide pt/family education and to maximize pt's level of independence in the home and community environment.     Pt's spiritual, cultural and educational needs considered and pt agreeable to plan of care and goals.     Anticipated barriers to physical therapy:     Goals:   Short Term Goals: 5 weeks   Patient will demonstrate independence with HEP in order to progress toward functional independence- MET  Pt will demonstrate improved pain by reports of less than or equal to 7/10 worst pain on the verbal rating scale in order to progress toward maximal functional ability and improve QOL.- MET  pt able to sit in upright seated position with less than 5/10 pain for improved QOL- MET  pt will be able to lift greater than or equal to 10 pounds form floor to waist with proper body mechanics for increased ease with lifting activities. - MET     Long Term Goals: 10 weeks   Patient will demonstrate improved function as indicated by a functional limitation score of 31 % on the FOTO.- progressing, not met  Pt will demonstrate improved pain by reports of less than or equal to 5/10 worst pain on the verbal rating scale in order to progress toward maximal functional ability and improve QOL.- MET  Pt will be able to correct slouch posture independently in static sitting for improved QOL- MET  Pt will improve bilateral upper extremity strength to 5/5/ with no pain for increased strength needed to perform lifting tasks- MET  pt will exhibit no pain with cervical range of motion in all planes for improved QOL and posture- progressing, not met  pt will exhibit no pain with bilateral shoulder range of motion in all planes for improved QOL and posture - MET  Pt will be able to lift greater than or equal to 20 pounds from floor to waist for increased ease with lifting tasks - MET  pt will report he has  returned to the gym with less than 2/10 pain for return to prior level of function. - progressing, not met       Plan     Plan of care Certification: Discharged on 6/29/2023     Ailyn Castellano PT, DPT  6/29/2023

## 2023-06-29 ENCOUNTER — CLINICAL SUPPORT (OUTPATIENT)
Dept: REHABILITATION | Facility: HOSPITAL | Age: 18
End: 2023-06-29
Payer: MEDICAID

## 2023-06-29 ENCOUNTER — DOCUMENTATION ONLY (OUTPATIENT)
Dept: REHABILITATION | Facility: HOSPITAL | Age: 18
End: 2023-06-29

## 2023-06-29 DIAGNOSIS — R29.898 UPPER EXTREMITY WEAKNESS: ICD-10-CM

## 2023-06-29 DIAGNOSIS — M35.7 SHOULDER JOINT HYPERMOBILITY: ICD-10-CM

## 2023-06-29 DIAGNOSIS — M62.89 MUSCLE HYPERTONICITY: Primary | ICD-10-CM

## 2023-06-29 PROCEDURE — 97110 THERAPEUTIC EXERCISES: CPT | Mod: PN

## 2023-06-29 NOTE — PROGRESS NOTES
[facility]   Discharge Note    Name: Chaitanya Ace  Clinic Number: [id number]    Therapy Diagnosis:   Encounter Diagnoses   Name Primary?    Muscle hypertonicity Yes    Shoulder joint hypermobility     Upper extremity weakness      Physician: Con Marques MD    Physician Orders: PT Eval and Treat   Medical Diagnosis from Referral:               M54.2 (ICD-10-CM) - Neck pain M54.12 (ICD-10-CM) - Cervical radiculopathy   Evaluation Date: 4/4/2023    Date of Last visit: 6/29/2023  Total Visits Received: 18    ASSESSMENT    Discharge reason: Patient has reached the maximum rehab potential for the present time    Discharge FOTO Score: 13% Limitation    Goals:   Short Term Goals: 5 weeks   Patient will demonstrate independence with HEP in order to progress toward functional independence- MET  Pt will demonstrate improved pain by reports of less than or equal to 7/10 worst pain on the verbal rating scale in order to progress toward maximal functional ability and improve QOL.- MET  pt able to sit in upright seated position with less than 5/10 pain for improved QOL- MET  pt will be able to lift greater than or equal to 10 pounds form floor to waist with proper body mechanics for increased ease with lifting activities. - MET     Long Term Goals: 10 weeks   Patient will demonstrate improved function as indicated by a functional limitation score of 31 % on the FOTO.- progressing, not met  Pt will demonstrate improved pain by reports of less than or equal to 5/10 worst pain on the verbal rating scale in order to progress toward maximal functional ability and improve QOL.- MET  Pt will be able to correct slouch posture independently in static sitting for improved QOL- MET  Pt will improve bilateral upper extremity strength to 5/5/ with no pain for increased strength needed to perform lifting tasks- MET  pt will exhibit no pain with cervical range of motion in all planes for improved QOL and  posture- progressing, not met  pt will exhibit no pain with bilateral shoulder range of motion in all planes for improved QOL and posture - MET  Pt will be able to lift greater than or equal to 20 pounds from floor to waist for increased ease with lifting tasks - MET  pt will report he has returned to the gym with less than 2/10 pain for return to prior level of function. - progressing, not met    PLAN   This patient is discharged from Physical Therapy      Ailyn Castellano, PT, DPT  6/29/2023

## 2023-07-17 PROBLEM — Z00.00 WELLNESS EXAMINATION: Status: RESOLVED | Noted: 2023-04-17 | Resolved: 2023-07-17

## 2023-07-24 ENCOUNTER — PATIENT MESSAGE (OUTPATIENT)
Dept: NEUROSURGERY | Facility: CLINIC | Age: 18
End: 2023-07-24
Payer: MEDICAID

## 2023-08-03 ENCOUNTER — PATIENT MESSAGE (OUTPATIENT)
Dept: NEUROSURGERY | Facility: CLINIC | Age: 18
End: 2023-08-03
Payer: MEDICAID

## 2023-08-03 ENCOUNTER — PATIENT MESSAGE (OUTPATIENT)
Dept: INTERNAL MEDICINE | Facility: CLINIC | Age: 18
End: 2023-08-03
Payer: MEDICAID

## 2023-08-14 ENCOUNTER — OFFICE VISIT (OUTPATIENT)
Dept: NEUROSURGERY | Facility: CLINIC | Age: 18
End: 2023-08-14
Payer: MEDICAID

## 2023-08-14 ENCOUNTER — PATIENT MESSAGE (OUTPATIENT)
Dept: NEUROSURGERY | Facility: CLINIC | Age: 18
End: 2023-08-14

## 2023-08-14 DIAGNOSIS — M54.2 CERVICALGIA: ICD-10-CM

## 2023-08-14 DIAGNOSIS — G89.29 CHRONIC LEFT-SIDED THORACIC BACK PAIN: Primary | ICD-10-CM

## 2023-08-14 DIAGNOSIS — M54.6 CHRONIC LEFT-SIDED THORACIC BACK PAIN: Primary | ICD-10-CM

## 2023-08-14 PROCEDURE — 99213 OFFICE O/P EST LOW 20 MIN: CPT | Mod: 95,,, | Performed by: STUDENT IN AN ORGANIZED HEALTH CARE EDUCATION/TRAINING PROGRAM

## 2023-08-14 PROCEDURE — 99213 PR OFFICE/OUTPT VISIT, EST, LEVL III, 20-29 MIN: ICD-10-PCS | Mod: 95,,, | Performed by: STUDENT IN AN ORGANIZED HEALTH CARE EDUCATION/TRAINING PROGRAM

## 2023-08-14 NOTE — PROGRESS NOTES
Neurosurgery  Established Patient    SUBJECTIVE:     History of Present Illness:  18 M presents for eval of 4 mo of left sided posterior neck and left parascapular pain.  There was no inciting trauma/falls.  He localizes pain to cervico thoracic jxn along the paraspinal region along the left side.  He has no radicular arm/leg pain.  He has occasional parasthesias in his UE/LE.  He denies dropping items from his hands, difficulty with buttons, or progressive weakness.  He has been to PT for his pain with some relief.  He notes that heating pads help alleviated the neck pain.  He has no gait instability.    Interval fu 8/14/23:  Pt presents in fu.  He complete the initial round of PT which has had no benefit.  He continues to complain of left trapezius pain which will radiate into his left triceps.  This pain is aggravated by sitting and driving.  He has been given an extension on his PT.  He states that dry needling and cupping has been no benefit but hasn't tried massage therapy at this point.    Review of patient's allergies indicates:   Allergen Reactions    Sulfamethoxazole-trimethoprim Other (See Comments)       Current Outpatient Medications   Medication Sig Dispense Refill    meloxicam (MOBIC) 15 MG tablet Take 1 tablet (15 mg total) by mouth once daily. (Patient not taking: Reported on 5/15/2023) 30 tablet 0     No current facility-administered medications for this visit.       Past Medical History:   Diagnosis Date    ADHD     Tongue tie     Has had it cut     No past surgical history on file.  Family History       Problem Relation (Age of Onset)    Cancer Paternal Grandmother    Depression Paternal Grandfather    Diabetes Maternal Grandfather    Hypertension Maternal Grandfather    Rheum arthritis Mother          Social History     Socioeconomic History    Marital status: Single   Tobacco Use    Smoking status: Never    Smokeless tobacco: Never   Substance and Sexual Activity    Alcohol use: No    Drug use:  Progress Notes by Jerrica Cordova MD at 4/26/2017  1:40 PM     Author: Jerrica Cordova MD Service: -- Author Type: Physician    Filed: 4/27/2017 11:15 AM Encounter Date: 4/26/2017 Status: Signed    : Jerrica Cordova MD (Physician)       Assessment/Plan:        Diagnoses and all orders for this visit:    Rash    Thiss did respond to antifungals and steroid cream however recurred after they were stopped. This has almost the classic appearance of impetigo today.   Will use Bactroban. I have asked him to contact us if this does not work or if, again, this recurs after he stops it.         Subjective:    Patient ID: Krish Mcallister is a 29 y.o. male.    Rash   This is a recurrent problem. The current episode started 1 to 4 weeks ago. The problem has been waxing and waning since onset. The affected locations include the face (periorally). The rash is characterized by blistering, itchiness and scaling. He was exposed to nothing. Pertinent negatives include no fever. (None) Past treatments include topical steroids (and topical antifiungal). The treatment provided moderate (these would work but then it reoccurs after he stops the creams ) relief.       The following portions of the patient's history were reviewed and updated as appropriate: allergies, current medications, past family history, past medical history, past social history, past surgical history and problem list.    Review of Systems   Constitutional: Negative for fever.   HENT: Negative for drooling.    Skin: Positive for rash.   All other systems reviewed and are negative.            Objective:    Physical Exam   Constitutional: He appears well-developed and well-nourished. No distress.   HENT:   Head:       Skin: Skin is warm and dry. Rash noted.   Nursing note and vitals reviewed.                  Not Currently    Sexual activity: Not Currently       Review of Systems  14 point ROS was negative    OBJECTIVE:     Vital Signs     There is no height or weight on file to calculate BMI.    Neurosurgery Physical Exam  Virtual visit    Diagnostic Results:  Flex/ex c spine: no instability  Reviewed    MRI c spine: no significant central or NF stenosis throughout.  CT c spine: no acute fractures/subluxation.  ASSESSMENT/PLAN:     18 M with left sided neck pain for the past 4 mo without radicular component.  His imaging is described above and shows no stenosis, deformity, or instability.  Unfortunately, his pain has been persistent and today is located mostly in the left trapezius and left parascapular region.  Will plan to get MRI thoracic spine w/o  -MRI T spine  -Continue PT  -Fu once completed.

## 2023-08-30 ENCOUNTER — HOSPITAL ENCOUNTER (OUTPATIENT)
Dept: RADIOLOGY | Facility: HOSPITAL | Age: 18
Discharge: HOME | End: 2023-08-30
Attending: STUDENT IN AN ORGANIZED HEALTH CARE EDUCATION/TRAINING PROGRAM
Payer: MEDICAID

## 2023-08-30 DIAGNOSIS — G89.29 CHRONIC LEFT-SIDED THORACIC BACK PAIN: ICD-10-CM

## 2023-08-30 DIAGNOSIS — M54.6 CHRONIC LEFT-SIDED THORACIC BACK PAIN: ICD-10-CM

## 2023-08-30 PROCEDURE — 72146 MRI CHEST SPINE W/O DYE: CPT | Mod: 26,,, | Performed by: RADIOLOGY

## 2023-08-30 PROCEDURE — 72146 MRI THORACIC SPINE WITHOUT CONTRAST: ICD-10-PCS | Mod: 26,,, | Performed by: RADIOLOGY

## 2023-08-30 PROCEDURE — 72146 MRI CHEST SPINE W/O DYE: CPT | Mod: TC,PN

## 2023-10-31 ENCOUNTER — TELEPHONE (OUTPATIENT)
Dept: INTERNAL MEDICINE | Facility: CLINIC | Age: 18
End: 2023-10-31
Payer: MEDICAID

## 2023-10-31 NOTE — TELEPHONE ENCOUNTER
Attempting to contact pt to re-schedule primary care appt due to provider leaving. Unable to reach patient at this time. Left voicemail. Pt has an upcoming appt this week.    Medicine

## 2024-04-23 ENCOUNTER — TELEPHONE (OUTPATIENT)
Dept: SPORTS MEDICINE | Facility: CLINIC | Age: 19
End: 2024-04-23
Payer: MEDICAID

## 2024-04-23 NOTE — TELEPHONE ENCOUNTER
LVM for mother returning her call.  Let her know that provider is not accepting her son's insurance at this time.  Also explained that provider does not see the issues that she is wanting addressed and that would need to be seen by [facility] as this provider does not treat this. Provided the [phone] for call back with any questions.   ----- Message from Morena Tolliver sent at 4/23/2024  2:19 PM CDT -----  Type:  Appointment Request     Name of Caller: pt's mother Mara Hodges  When is the first available appointment?No access  Symptoms:possible muscle torn  Would the patient rather a call back or a response via [facility]? Call back  Best Call Back Number:[phone]   Additional Information: Pt's mother states patient has been having these pains for over a year. He went to a back and spine clinic over a year ago at [facility]. Pt was told they didn't find anything and was sent to PT. Patient would like to be seen as soon as possible.

## 2025-05-06 ENCOUNTER — LAB VISIT (OUTPATIENT)
Dept: LAB | Facility: HOSPITAL | Age: 20
End: 2025-05-06
Attending: EMERGENCY MEDICINE
Payer: MEDICAID

## 2025-05-06 DIAGNOSIS — Z02.1 ENCOUNTER FOR PRE-EMPLOYMENT EXAMINATION: ICD-10-CM

## 2025-05-06 LAB
HBV SURFACE AB SER-ACNC: <3 MIU/ML
HBV SURFACE AB SERPL IA-ACNC: NORMAL M[IU]/ML

## 2025-05-06 PROCEDURE — 86765 RUBEOLA ANTIBODY: CPT

## 2025-05-06 PROCEDURE — 86706 HEP B SURFACE ANTIBODY: CPT

## 2025-05-06 PROCEDURE — 86480 TB TEST CELL IMMUN MEASURE: CPT

## 2025-05-06 PROCEDURE — 36415 COLL VENOUS BLD VENIPUNCTURE: CPT

## 2025-05-06 PROCEDURE — 86735 MUMPS ANTIBODY: CPT

## 2025-05-06 PROCEDURE — 86762 RUBELLA ANTIBODY: CPT

## 2025-05-06 PROCEDURE — 86787 VARICELLA-ZOSTER ANTIBODY: CPT

## 2025-05-07 LAB
MUMPS IGG (OHS): 27.2 AU/ML
MUMPS IGG INTERPRETATION (OHS): POSITIVE
RUBEOLA (MEASLES) IGG (OHS): 21.5 AU/ML
RUBEOLA IGG INTERP. (OHS): POSITIVE
RUBV IGG SER-ACNC: 14.1 IU/ML
RUBV IGG SER-IMP: REACTIVE
V.ZOSTER IGG INTERP (OHS): NEGATIVE
VARICELLA ZOSTER IGG (OHS): 0.28 S/CO

## 2025-05-08 LAB
MITOGEN MINUS NIL (OHS): 9.91
NIL TB SYNCED (OHS): 0.09
QUANTIFERON GOLD INTERP (OHS): NEGATIVE
TB1 AG MINUS NIL (OHS): <0
TB2 AG MINUS NIL (OHS): <0

## 2025-06-18 ENCOUNTER — PATIENT MESSAGE (OUTPATIENT)
Dept: RESEARCH | Facility: HOSPITAL | Age: 20
End: 2025-06-18
Payer: MEDICAID